# Patient Record
Sex: FEMALE | Race: WHITE | NOT HISPANIC OR LATINO | Employment: PART TIME | URBAN - METROPOLITAN AREA
[De-identification: names, ages, dates, MRNs, and addresses within clinical notes are randomized per-mention and may not be internally consistent; named-entity substitution may affect disease eponyms.]

---

## 2018-08-15 ENCOUNTER — DOCUMENTATION (OUTPATIENT)
Dept: BARIATRICS/WEIGHT MGMT | Facility: CLINIC | Age: 33
End: 2018-08-15

## 2018-08-28 ENCOUNTER — DOCUMENTATION (OUTPATIENT)
Dept: BARIATRICS/WEIGHT MGMT | Facility: CLINIC | Age: 33
End: 2018-08-28

## 2018-08-28 ENCOUNTER — OFFICE VISIT (OUTPATIENT)
Dept: PSYCHIATRY | Facility: CLINIC | Age: 33
End: 2018-08-28

## 2018-08-28 ENCOUNTER — OFFICE VISIT (OUTPATIENT)
Dept: BARIATRICS/WEIGHT MGMT | Facility: CLINIC | Age: 33
End: 2018-08-28

## 2018-08-28 VITALS
TEMPERATURE: 98.1 F | OXYGEN SATURATION: 99 % | HEIGHT: 65 IN | RESPIRATION RATE: 18 BRPM | SYSTOLIC BLOOD PRESSURE: 134 MMHG | WEIGHT: 259.01 LBS | DIASTOLIC BLOOD PRESSURE: 82 MMHG | HEART RATE: 88 BPM | BODY MASS INDEX: 43.15 KG/M2

## 2018-08-28 DIAGNOSIS — E66.01 MORBID OBESITY WITH BMI OF 40.0-44.9, ADULT (HCC): ICD-10-CM

## 2018-08-28 DIAGNOSIS — J45.909 ASTHMA, UNSPECIFIED ASTHMA SEVERITY, UNSPECIFIED WHETHER COMPLICATED, UNSPECIFIED WHETHER PERSISTENT: ICD-10-CM

## 2018-08-28 DIAGNOSIS — G35 MS (MULTIPLE SCLEROSIS) (HCC): ICD-10-CM

## 2018-08-28 DIAGNOSIS — R60.0 LOWER EXTREMITY EDEMA: ICD-10-CM

## 2018-08-28 DIAGNOSIS — Q65.89 HIP DYSPLASIA: ICD-10-CM

## 2018-08-28 DIAGNOSIS — F17.200 CURRENT SMOKER: ICD-10-CM

## 2018-08-28 DIAGNOSIS — G43.909 MIGRAINE WITHOUT STATUS MIGRAINOSUS, NOT INTRACTABLE, UNSPECIFIED MIGRAINE TYPE: Primary | ICD-10-CM

## 2018-08-28 DIAGNOSIS — R41.3 SHORT-TERM MEMORY LOSS: ICD-10-CM

## 2018-08-28 DIAGNOSIS — K21.9 GASTROESOPHAGEAL REFLUX DISEASE, ESOPHAGITIS PRESENCE NOT SPECIFIED: ICD-10-CM

## 2018-08-28 DIAGNOSIS — F32.A DEPRESSION, UNSPECIFIED DEPRESSION TYPE: ICD-10-CM

## 2018-08-28 DIAGNOSIS — H46.9 OPTIC NEURITIS: ICD-10-CM

## 2018-08-28 DIAGNOSIS — R06.02 SOB (SHORTNESS OF BREATH) ON EXERTION: ICD-10-CM

## 2018-08-28 DIAGNOSIS — Z90.49 S/P CHOLE: ICD-10-CM

## 2018-08-28 DIAGNOSIS — R05.9 COUGHING: ICD-10-CM

## 2018-08-28 DIAGNOSIS — F43.23 ADJUSTMENT DISORDER WITH MIXED ANXIETY AND DEPRESSED MOOD: ICD-10-CM

## 2018-08-28 DIAGNOSIS — R53.83 FATIGUE, UNSPECIFIED TYPE: ICD-10-CM

## 2018-08-28 DIAGNOSIS — M25.50 ARTHRALGIA, UNSPECIFIED JOINT: ICD-10-CM

## 2018-08-28 DIAGNOSIS — F41.9 ANXIETY: ICD-10-CM

## 2018-08-28 DIAGNOSIS — E66.01 MORBID OBESITY (HCC): Primary | ICD-10-CM

## 2018-08-28 DIAGNOSIS — K58.9 IRRITABLE BOWEL SYNDROME, UNSPECIFIED TYPE: ICD-10-CM

## 2018-08-28 LAB
ALBUMIN SERPL-MCNC: 4.44 G/DL (ref 3.2–4.8)
ALBUMIN/GLOB SERPL: 1.5 G/DL (ref 1.5–2.5)
ALP SERPL-CCNC: 100 U/L (ref 25–100)
ALT SERPL-CCNC: 14 U/L (ref 7–40)
AST SERPL-CCNC: 13 U/L (ref 0–33)
BASOPHILS # BLD AUTO: 0.04 10*3/MM3 (ref 0–0.2)
BASOPHILS NFR BLD AUTO: 0.5 % (ref 0–1)
BILIRUB SERPL-MCNC: 0.1 MG/DL (ref 0.3–1.2)
BUN SERPL-MCNC: 13 MG/DL (ref 9–23)
BUN/CREAT SERPL: 13 (ref 7–25)
CALCIUM SERPL-MCNC: 8.9 MG/DL (ref 8.7–10.4)
CHLORIDE SERPL-SCNC: 115 MMOL/L (ref 99–109)
CHOLEST SERPL-MCNC: 161 MG/DL (ref 0–200)
CO2 SERPL-SCNC: 19 MMOL/L (ref 20–31)
CREAT SERPL-MCNC: 1 MG/DL (ref 0.6–1.3)
EOSINOPHIL # BLD AUTO: 0.19 10*3/MM3 (ref 0–0.3)
EOSINOPHIL NFR BLD AUTO: 2.4 % (ref 0–3)
ERYTHROCYTE [DISTWIDTH] IN BLOOD BY AUTOMATED COUNT: 14.1 % (ref 11.3–14.5)
GLOBULIN SER CALC-MCNC: 3 GM/DL
GLUCOSE SERPL-MCNC: 88 MG/DL (ref 70–100)
HBA1C MFR BLD: 5.5 % (ref 4.8–5.6)
HCT VFR BLD AUTO: 42.3 % (ref 34.5–44)
HDLC SERPL-MCNC: 48 MG/DL (ref 40–60)
HGB BLD-MCNC: 12.8 G/DL (ref 11.5–15.5)
IMM GRANULOCYTES # BLD: 0.01 10*3/MM3 (ref 0–0.03)
IMM GRANULOCYTES NFR BLD: 0.1 % (ref 0–0.6)
LDLC SERPL CALC-MCNC: 87 MG/DL (ref 0–100)
LYMPHOCYTES # BLD AUTO: 1.97 10*3/MM3 (ref 0.6–4.8)
LYMPHOCYTES NFR BLD AUTO: 25.4 % (ref 24–44)
MCH RBC QN AUTO: 26.9 PG (ref 27–31)
MCHC RBC AUTO-ENTMCNC: 30.3 G/DL (ref 32–36)
MCV RBC AUTO: 88.9 FL (ref 80–99)
MONOCYTES # BLD AUTO: 0.48 10*3/MM3 (ref 0–1)
MONOCYTES NFR BLD AUTO: 6.2 % (ref 0–12)
NEUTROPHILS # BLD AUTO: 5.08 10*3/MM3 (ref 1.5–8.3)
NEUTROPHILS NFR BLD AUTO: 65.4 % (ref 41–71)
PLATELET # BLD AUTO: 321 10*3/MM3 (ref 150–450)
POTASSIUM SERPL-SCNC: 4.5 MMOL/L (ref 3.5–5.5)
PROT SERPL-MCNC: 7.4 G/DL (ref 5.7–8.2)
RBC # BLD AUTO: 4.76 10*6/MM3 (ref 3.89–5.14)
SODIUM SERPL-SCNC: 138 MMOL/L (ref 132–146)
TRIGL SERPL-MCNC: 128 MG/DL (ref 0–150)
TSH SERPL DL<=0.005 MIU/L-ACNC: 1.13 MIU/ML (ref 0.35–5.35)
VLDLC SERPL CALC-MCNC: 25.6 MG/DL
WBC # BLD AUTO: 7.77 10*3/MM3 (ref 3.5–10.8)

## 2018-08-28 PROCEDURE — 99214 OFFICE O/P EST MOD 30 MIN: CPT | Performed by: PHYSICIAN ASSISTANT

## 2018-08-28 PROCEDURE — 90791 PSYCH DIAGNOSTIC EVALUATION: CPT | Performed by: PSYCHOLOGIST

## 2018-08-28 RX ORDER — BUPROPION HYDROCHLORIDE 150 MG/1
TABLET, EXTENDED RELEASE ORAL
COMMUNITY
Start: 2018-08-05

## 2018-08-28 RX ORDER — FAMOTIDINE 20 MG/1
TABLET, FILM COATED ORAL
COMMUNITY
Start: 2018-08-27 | End: 2018-10-12

## 2018-08-28 RX ORDER — TIZANIDINE 4 MG/1
4 TABLET ORAL NIGHTLY PRN
COMMUNITY
End: 2018-10-18

## 2018-08-28 RX ORDER — GABAPENTIN 400 MG/1
400 CAPSULE ORAL 3 TIMES DAILY
COMMUNITY
End: 2018-10-12

## 2018-08-28 RX ORDER — FLUTICASONE PROPIONATE 50 MCG
2 SPRAY, SUSPENSION (ML) NASAL DAILY
COMMUNITY
End: 2018-10-12

## 2018-08-28 RX ORDER — DOXEPIN HYDROCHLORIDE 10 MG/1
CAPSULE ORAL
COMMUNITY
Start: 2018-08-27

## 2018-08-28 RX ORDER — PROMETHAZINE HYDROCHLORIDE 25 MG/1
25 TABLET ORAL EVERY 6 HOURS PRN
COMMUNITY

## 2018-08-28 RX ORDER — CHOLECALCIFEROL (VITAMIN D3) 50 MCG
TABLET ORAL
COMMUNITY
Start: 2018-08-17

## 2018-08-28 RX ORDER — ACETAZOLAMIDE 250 MG/1
250 TABLET ORAL 3 TIMES DAILY
COMMUNITY

## 2018-08-28 RX ORDER — NARATRIPTAN 2.5 MG/1
2.5 TABLET ORAL ONCE AS NEEDED
COMMUNITY

## 2018-08-28 RX ORDER — SERTRALINE HYDROCHLORIDE 100 MG/1
100 TABLET, FILM COATED ORAL DAILY
COMMUNITY

## 2018-08-28 RX ORDER — CELECOXIB 200 MG/1
CAPSULE ORAL
COMMUNITY
Start: 2018-08-27

## 2018-08-28 RX ORDER — SENNOSIDES 8.6 MG
TABLET ORAL NIGHTLY
COMMUNITY

## 2018-08-28 RX ORDER — SIMVASTATIN 20 MG
20 TABLET ORAL NIGHTLY
COMMUNITY

## 2018-08-28 RX ORDER — GABAPENTIN 100 MG/1
100 CAPSULE ORAL 3 TIMES DAILY
COMMUNITY
End: 2018-10-12

## 2018-08-28 NOTE — PROGRESS NOTES
[facility] BARIATRIC SURGERY  [address]  Formerly [facility] [address]  [phone]      Patient  Name:  Darleen Barber  :  1985      Date of Visit: 2018      Chief Complaint:  weight gain; unable to maintain weight loss    History of Present Illness:  Darleen Barber is a 33 y.o. female who presents today for evaluation, education and consultation regarding weight loss surgery. The patient is interested in sleeve gastrectomy.     Darleen has been overweight for at least 2 years, has been 35 pounds or more overweight for at least 2 years, has been 100 pounds or more overweight for 1 or more years and started dieting at age 30.      Previous diet attempts include: Calorie Counting and Rivka's Diet; None; Wellbutrin.  The most weight Darleen lost was 25 pounds on dieting but was only able to maintain that weight loss for 1 year.  Her maximum lifetime weight is 275 pounds.    As above, patient has been overweight for many years, with numerous failed dietary/weight loss attempts.  She now has obesity related comorbidities and as such has decided to pursue weight loss surgery.    Mother had gastric bypass With Dr. Ho and is with her today.     MS:  Diagnosed 2016 with loss of vision.  3-4 flares a year requiring ED or hospitalization, treated with steroids. Followed by Dr. Corona neurologist at .  Ocrevus infusions every 6 months.  Limited mobility with imbalance. Some left sided weakness.     Arthralgias/ congenital hip dysplasia: hips, legs, shoulder, lower back.  Celebrex, muscle relaxers, gabapentin. No steroid injections.     GI history: h/o GERD: takes famotidine BID.   EGD 4 years ago.  Unknown findings. No h/o h pylori. S/p jack with acalculous cholecystitis.     All past medical, surgical, social and family history have been obtained and discussed as pertinent to bariatric surgery as below.     Past Medical History:   Diagnosis Date   • Anxiety    • Asthma    • Coughing     worse  "at night   • Current smoker    • Depression     \"adjustment disorder\"   • Fatigue    • GERD (gastroesophageal reflux disease)     takes famotidine BID.   EGD 4 years ago.  Unknown findings. No h/o h pylori.    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  Worsened by falls. Uses cane. No surgeries.    • IBS (irritable bowel syndrome)     mixed diarrhea/ constipation   • Joint pain     hips, legs, shoulder, lower back.  Celebrex, muscle relaxers, gabapentin. No steroid injections.    • Lower extremity edema    • Migraines    • Morbid obesity with BMI of 40.0-44.9, adult (CMS/[facility])    • MS (multiple sclerosis) (CMS/[facility])     Limits mobility with imbalance.  Diagnosed 2016 with loss of vision.  3-4 flares a year requiring ED or hospitalization, treated with steroids.     • Optic neuritis    • S/P jack     acalculous    • Seasonal allergies    • Short-term memory loss     secondary to MS   • SOB (shortness of breath) on exertion      Past Surgical History:   Procedure Laterality Date   • COLONOSCOPY  2014    unremarkable   • DILATATION AND CURETTAGE     • EAR TUBES  1988   • LAPAROSCOPIC CHOLECYSTECTOMY  2013    acalculous   • LUMBAR PUNCTURE  2016, 2018    diagnostic testing for MS, removing CSF   • TONSILLECTOMY  1989       Allergies   Allergen Reactions   • Sulfa Antibiotics Other (See Comments)     Very hyperactive       Current Outpatient Prescriptions:   •  acetaZOLAMIDE (DIAMOX) 250 MG tablet, Take 250 mg by mouth 3 (Three) Times a Day., Disp: , Rfl:   •  buPROPion SR (WELLBUTRIN SR) 150 MG 12 hr tablet, , Disp: , Rfl:   •  celecoxib (CeleBREX) 200 MG capsule, , Disp: , Rfl:   •  Cholecalciferol (VITAMIN D) 2000 units tablet, , Disp: , Rfl:   •  doxepin (SINEquan) 10 MG capsule, , Disp: , Rfl:   •  fluticasone (FLONASE) 50 MCG/ACT nasal spray, 2 sprays into the nostril(s) as directed by provider Daily., Disp: , Rfl:   •  gabapentin (NEURONTIN) 100 MG capsule, Take 100 mg by mouth 3 (Three) Times a Day., Disp: , Rfl:   •  " gabapentin (NEURONTIN) 400 MG capsule, Take 400 mg by mouth 3 (Three) Times a Day., Disp: , Rfl:   •  naratriptan (AMERGE) 2.5 MG tablet, Take 2.5 mg by mouth 1 (One) Time As Needed for Migraine. 2.5 mg at onset of headache, may repeat in 4 hours if needed, Disp: , Rfl:   •  promethazine (PHENERGAN) 25 MG tablet, Take 25 mg by mouth Every 6 (Six) Hours As Needed for Nausea or Vomiting., Disp: , Rfl:   •  senna (SENOKOT) 8.6 MG tablet tablet, Take  by mouth Every Night., Disp: , Rfl:   •  sertraline (ZOLOFT) 100 MG tablet, Take 100 mg by mouth Daily., Disp: , Rfl:   •  simvastatin (ZOCOR) 20 MG tablet, Take 20 mg by mouth Every Night., Disp: , Rfl:   •  tiZANidine (ZANAFLEX) 4 MG tablet, Take 4 mg by mouth At Night As Needed for Muscle Spasms., Disp: , Rfl:   •  famotidine (PEPCID) 20 MG tablet, , Disp: , Rfl:     Social History     Social History   • Marital status: Single     Spouse name: N/A   • Number of children: N/A   • Years of education: N/A     Occupational History   • Not on file.     Social History Main Topics   • Smoking status: Current Every Day Smoker     Packs/day: 0.25     Types: Cigarettes   • Smokeless tobacco: Never Used      Comment: light, daily smoker   • Alcohol use No   • Drug use: Yes     Types: Marijuana      Comment: Daily   • Sexual activity: Not on file      Comment: no hormones     Other Topics Concern   • Not on file     Social History Narrative    Lives in [place] with mother and roommate (ex- girlfriend). 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       Family History   Problem Relation Age of Onset   • Obesity Mother    • Sleep apnea Mother    • Heart attack Maternal Grandmother    • Obesity Maternal Grandmother    • Hypertension Maternal Grandmother    • Heart disease Maternal Grandmother    • Sleep apnea Maternal Grandmother    • Obesity Maternal Grandfather    • Diabetes Maternal Grandfather    • Hypertension Maternal Grandfather    • Heart attack Maternal Grandfather    • Heart  disease Maternal Grandfather    • Sleep apnea Maternal Grandfather    • Pneumonia Maternal Grandfather        Review of Systems:  Constitutional:  Reports fatigue, weight gain and denies fevers, chills.  HEENT:  seasonal allergies, migraines, optic neuritis  Cardiovascular:  Reports HLD, edema and denies HTN, CAD, Atrial Fib, hx heart disease, heart murmur, hx MI, chest pain, palpitations, hx DVT.  Respiratory:  Reports shortness of breath , cough , asthma and denies wheezing, sleep apnea, hx PE.  Gastrointestinal:  Reports heartburn, IBS, gallbladder issues and denies dysphagia, nausea, vomiting, abdominal pain, melena, blood in stool, liver disease, hx pancreatitis.  Genitourinary:  Reports none and denies history of  frequent UTI, incontinence, hematuria, dysuria, polyuria, polydipsia, renal insufficiency, renal failure.    Musculoskeletal:  Reports joint pain, back pain and denies fibromyalgia.  Neurological:  Reports migraines, numbness /tingling, dizziness, confusion, MS and denies seizure, stroke.  Psychiatric:  Reports anxiety, depression and denies bipolar disorder, hx suicide attempt, hx self injury, eating disorder.  Endocrine:  Reports none and denies glucose intolerance, diabetes, thyroid disease, gout.  Hematologic:  Reports none and denies anemia, bleeding disorder, hx blood transfusion.  Skin:  Reports none and denies rashes, hx MRSA.      Physical Exam:  Vital Signs:  Weight: 117 kg (259 lb 0.2 oz)   Body mass index is 43.77 kg/m².  Temp: 98.1 °F (36.7 °C)   Heart Rate: 88   BP: 134/82     Physical Exam   Constitutional: She is oriented to person, place, and time. She appears well-developed and well-nourished.   HENT:   Head: Normocephalic and atraumatic.   Mouth/Throat: Oropharynx is clear and moist.   Eyes: EOM are normal.   Neck: Normal range of motion. Neck supple. No thyromegaly present.   Cardiovascular: Normal rate, regular rhythm and normal heart sounds.    Pulmonary/Chest: Effort normal and  breath sounds normal. No respiratory distress. She has no wheezes.   Abdominal: Soft. Bowel sounds are normal. She exhibits no distension. There is no tenderness.   Lap scars   Musculoskeletal: Normal range of motion. She exhibits no edema.   Walking with cane   Neurological: She is alert and oriented to person, place, and time.   Skin: Skin is warm and dry.   tattoos   Psychiatric: She has a normal mood and affect. Her behavior is normal. Judgment and thought content normal.   Vitals reviewed.      Patient Active Problem List   Diagnosis   • Optic neuritis   • MS (multiple sclerosis) (CMS/[facility])   • Hip dysplasia   • Fatigue   • Seasonal allergies   • Asthma   • Migraines   • Anxiety   • Morbid obesity with BMI of 40.0-44.9, adult (CMS/[facility])   • GERD (gastroesophageal reflux disease)   • Lower extremity edema   • S/P jack   • SOB (shortness of breath) on exertion   • IBS (irritable bowel syndrome)   • Coughing   • Depression   • Joint pain   • Current smoker   • Short-term memory loss       Assessment:    Darleen Barber is a 33 y.o. year old female with medically complicated obesity pursuing sleeve gastrectomy.    Weight loss surgery is deemed medically necessary given the following obesity related comorbidities including osteoarthritis, back pain, knee pain, GERD, gall bladder disease, asthma, edema, depression and MS. Hip dysplasia with current Weight: 117 kg (259 lb 0.2 oz) and Body mass index is 43.77 kg/m²..    Plan:  The consultation plan and program requirements were reviewed with the patient.  The patient has been advised that a letter of medical support must be obtained from her primary care physician or referring provider. A psychological evaluation will be arranged.  A nutritional evaluation will be performed.  The patient was advised to start a high protein and low carbohydrate diet.  Necessary lifestyle modifications were discussed.  Instructions on how to access SHABBIR was given to the patient.  SHABBIR is an  internet based educational video that explains the surgical procedure chosen and answers basic questions regarding that procedure.     Preoperative testing will include: CBC, CMP, Lipids, TSH, HgA1C, H.Pylori, Pulmonary Function Testing, Nicotine Testing, CXR, EKG and EGD     Additional preop clearances required prior to surgery: Cardiac and Neurology.  Will schedule with [facility] cardiology.  Patient will schedule with established neurologist to discuss need to avoid steroids and immunomodulators perioperatively.     The patient has been educated on expected postoperative lifestyle changes, including commitment to high protein diet, vitamin regimen, and exercise program.  They are aware that support groups are encouraged for optimal weight loss results. Patient understands that bariatric surgery is not cosmetic surgery but rather a tool to help make a lifelong commitment to lifestyle changes including diet, exercise, behavior modifications, and healthy habits. The procedure was discussed with the patient and all questions were answered. The importance of avoiding ASA/ NSAIDS/ steroids/ tobacco/ hormones/ immunomodulators perioperatively was discussed.         Kathy Hugo PA-C

## 2018-08-28 NOTE — PROGRESS NOTES
PROGRESS NOTE    Data:  Darleen Barber is a 33 y.o. female who met with the undersigned for a scheduled individual outpatient therapy session from 8:00 - 8:40am.      Clinical Maneuvering/Intervention:      Pt talked about struggling with obesity for the last couple of years. Despite trying different weight loss plans and diets, the pt reported being unsuccessful in losing weight. A psychological evaluation was conducted in order to assess past and current level of functioning. Areas assessed included, but were not limited to: perception of social support, perception of ability to face and deal with challenges in life (positive functioning), anxiety symptoms, depressive symptoms, perspective on beliefs/belief system, coping skills for stress, intelligence level, etc. Therapeutic rapport was established. Interventions conducted today were geared towards assessing the pt's readiness for weight loss surgery and identifying and psychological contraindications for undergoing such a major life change. Social support was deemed strong (specific to weight loss surgery/weight loss in this manner and in a general sense): mother and friends. Current psychological struggles were deemed moderate, but included: MS, MS symptoms, weight gain (which she attributes to having to take steroids for MS symptoms), and anxiety/depression due to having MS. She does see a therapist for anxiety/depression symptoms and takes medication to treat these symptoms. Stress level was deemed moderate and related to living with her girlfriend (they are currently starting to breakup) and her girlfriend's brother. She does admit to feeling defeated by weight gain and that she may withhold at times how much this bothers her. Coping skills for distress and related to undergoing a major life change such as weight loss surgery/weight loss were deemed strong and included reaching out for help when she needs to, having an optimistic view of life, and having the  ability to use humor in difficult situations. The pt endorsed having characteristics of readiness to improve quality of life through the major life changes inherent in the journey of weight loss surgery including explaining how she has the mindset of being ready for this and not feeling very nervous about it. The pt could also articulate a deep sense of purpose in undergoing this major life change by saying that she want to do it for health reasons: to help with MS symptoms and be able to get hip surgery. Demonstration of positive/appropriate dietary changes and evidence of exercising were limited, but she could talk about what she plans to do in the near future.     Mental Status Exam  Hygiene:  good  Dress: normal  Attitude:  cooperative and proactive  Motor Activity: normal  Speech: normal  Mood:  level   Affect:  congruent  Thought Processes: normal  Thought Content:  normal  Suicidal Thoughts:  not endorsed  Homicidal Thoughts:  not endorsed  Crisis Safety Plan: not needed   Hallucinations:  none      Patient's Support Network Includes:  family, friends      Progress toward goal: there is evidence to suggest that she is taking measures to improve the quality of her life including seeking weight loss surgery.      Functional Status: moderate      Prognosis: good    Assessment      The pt presented with adjustment disorder - anxiety/depression mixed mainly related to having MS, but depressive symptoms at times being exasperated by obesity.The pt is in the planning stage of change (having a readiness mindset and planning to change eating habits in the near future), moving towards the action stage of change (demonstrating compliance with diet changes required to fulfill candidacy for surgery) regarding readiness to receive weight loss surgery/lose weight.     From a psychological standpoint, the pt presents as a good candidate for bariatric surgery.  She is motivated for the surgery, has showed readiness for the  lifestyle change in terms of planning to soon adjust her eating habits, and seems to have appropriate expectations of how to prepare and how to live after surgery in order to lose weight successfully.      Plan      In order to diminish symptoms distress surrounding obesity, the pt is to follow up with her bariatric surgeon in order to receive weight loss surgery as soon as feasible/appropriate and based on success with compliance to adhering to the proper diet. In order to manage symptoms of anxiety/depression, she is to continue seeing her therapist and taking medication to manage mood (ongoing).     Gina Christina, PhD, LP

## 2018-09-06 NOTE — PROGRESS NOTES
"Weight Loss Surgery  Presurgical Nutrition Assessment     Darleen Barber  08/28/2018  [id number]  [id number]  1985  female    Surgery desired: Sleeve Gastrectomy    Weight: 117 kg (259 lb 0.2 oz)   Body mass index is 43.77 kg/m².  Past Medical History:   Diagnosis Date   • Anxiety    • Asthma    • Coughing     worse at night   • Current smoker    • Depression     \"adjustment disorder\"   • Fatigue    • GERD (gastroesophageal reflux disease)     takes famotidine BID.   EGD 4 years ago.  Unknown findings. No h/o h pylori.    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  Worsened by falls. Uses cane. No surgeries.    • IBS (irritable bowel syndrome)     mixed diarrhea/ constipation   • Joint pain     hips, legs, shoulder, lower back.  Celebrex, muscle relaxers, gabapentin. No steroid injections.    • Lower extremity edema    • Migraines    • Morbid obesity with BMI of 40.0-44.9, adult (CMS/[facility])    • MS (multiple sclerosis) (CMS/[facility])     Limits mobility with imbalance.  Diagnosed 2016 with loss of vision.  3-4 flares a year requiring ED or hospitalization, treated with steroids.     • Optic neuritis    • S/P jack     acalculous    • Seasonal allergies    • Short-term memory loss     secondary to MS   • SOB (shortness of breath) on exertion      Past Surgical History:   Procedure Laterality Date   • COLONOSCOPY  2014    unremarkable   • DILATATION AND CURETTAGE     • EAR TUBES  1988   • LAPAROSCOPIC CHOLECYSTECTOMY  2013    acalculous   • LUMBAR PUNCTURE  2016, 2018    diagnostic testing for MS, removing CSF   • TONSILLECTOMY  1989     Allergies   Allergen Reactions   • Sulfa Antibiotics Other (See Comments)     Very hyperactive       Current Outpatient Prescriptions:   •  acetaZOLAMIDE (DIAMOX) 250 MG tablet, Take 250 mg by mouth 3 (Three) Times a Day., Disp: , Rfl:   •  buPROPion SR (WELLBUTRIN SR) 150 MG 12 hr tablet, , Disp: , Rfl:   •  celecoxib (CeleBREX) 200 MG capsule, , Disp: , Rfl:   •  Cholecalciferol (VITAMIN D) 2000 " units tablet, , Disp: , Rfl:   •  doxepin (SINEquan) 10 MG capsule, , Disp: , Rfl:   •  famotidine (PEPCID) 20 MG tablet, , Disp: , Rfl:   •  fluticasone (FLONASE) 50 MCG/ACT nasal spray, 2 sprays into the nostril(s) as directed by provider Daily., Disp: , Rfl:   •  gabapentin (NEURONTIN) 100 MG capsule, Take 100 mg by mouth 3 (Three) Times a Day., Disp: , Rfl:   •  gabapentin (NEURONTIN) 400 MG capsule, Take 400 mg by mouth 3 (Three) Times a Day., Disp: , Rfl:   •  naratriptan (AMERGE) 2.5 MG tablet, Take 2.5 mg by mouth 1 (One) Time As Needed for Migraine. 2.5 mg at onset of headache, may repeat in 4 hours if needed, Disp: , Rfl:   •  Ocrelizumab 300 MG/10ML solution, Infuse  into a venous catheter., Disp: , Rfl:   •  promethazine (PHENERGAN) 25 MG tablet, Take 25 mg by mouth Every 6 (Six) Hours As Needed for Nausea or Vomiting., Disp: , Rfl:   •  senna (SENOKOT) 8.6 MG tablet tablet, Take  by mouth Every Night., Disp: , Rfl:   •  sertraline (ZOLOFT) 100 MG tablet, Take 100 mg by mouth Daily., Disp: , Rfl:   •  simvastatin (ZOCOR) 20 MG tablet, Take 20 mg by mouth Every Night., Disp: , Rfl:   •  tiZANidine (ZANAFLEX) 4 MG tablet, Take 4 mg by mouth At Night As Needed for Muscle Spasms., Disp: , Rfl:       Nutrition Assessment    Estimated energy needs: 2000    Estimated calories for weight loss:1500    IBW (Pounds):  159      Excess body weight (Pounds):100       Nutrition Recall  24 Hour recall: (B) (L) (D) -  Reviewed and discussed with patient       Exercise  never      Education    Provided manual:    Sleeve Gastrectomy    Provided follow-up options for support, including contact information for dietitians here, if desired.  Web-based support information and apps for smart phones and computers given.  Noted that monthly support group is offered at this clinic, and that support is associated with weight loss.    Recommend that team proceed with surgery and follow per protocol.      Nutrition Goals   Dietary  Guidelines per manual  Protein goal:  grams per day   Eliminate soda    Exercise Goals  Add 15-30 minutes of activity per day as tolerated        Eva Monae RD  08/28/2018  9:05 AM

## 2018-09-28 ENCOUNTER — RESULTS ENCOUNTER (OUTPATIENT)
Dept: BARIATRICS/WEIGHT MGMT | Facility: CLINIC | Age: 33
End: 2018-09-28

## 2018-09-28 DIAGNOSIS — R05.9 COUGHING: ICD-10-CM

## 2018-09-28 DIAGNOSIS — K21.9 GASTROESOPHAGEAL REFLUX DISEASE, ESOPHAGITIS PRESENCE NOT SPECIFIED: ICD-10-CM

## 2018-10-12 ENCOUNTER — OFFICE VISIT (OUTPATIENT)
Dept: BARIATRICS/WEIGHT MGMT | Facility: CLINIC | Age: 33
End: 2018-10-12

## 2018-10-12 VITALS
HEIGHT: 65 IN | RESPIRATION RATE: 18 BRPM | TEMPERATURE: 97.8 F | SYSTOLIC BLOOD PRESSURE: 122 MMHG | BODY MASS INDEX: 44.48 KG/M2 | OXYGEN SATURATION: 99 % | HEART RATE: 95 BPM | DIASTOLIC BLOOD PRESSURE: 84 MMHG | WEIGHT: 267 LBS

## 2018-10-12 DIAGNOSIS — K21.9 GASTROESOPHAGEAL REFLUX DISEASE, ESOPHAGITIS PRESENCE NOT SPECIFIED: Primary | ICD-10-CM

## 2018-10-12 DIAGNOSIS — G35 MS (MULTIPLE SCLEROSIS) (HCC): ICD-10-CM

## 2018-10-12 DIAGNOSIS — E66.01 MORBID OBESITY (HCC): ICD-10-CM

## 2018-10-12 PROCEDURE — 99214 OFFICE O/P EST MOD 30 MIN: CPT | Performed by: PHYSICIAN ASSISTANT

## 2018-10-12 RX ORDER — FLUTICASONE PROPIONATE 50 MCG
2 SPRAY, SUSPENSION (ML) NASAL DAILY
COMMUNITY

## 2018-10-12 RX ORDER — METHOCARBAMOL 500 MG/1
500 TABLET, FILM COATED ORAL 3 TIMES DAILY
COMMUNITY

## 2018-10-12 RX ORDER — FAMOTIDINE 20 MG/1
20 TABLET, FILM COATED ORAL 2 TIMES DAILY
COMMUNITY
End: 2019-02-28

## 2018-10-12 RX ORDER — GABAPENTIN 600 MG/1
600 TABLET ORAL 3 TIMES DAILY
COMMUNITY

## 2018-10-12 NOTE — PROGRESS NOTES
"[facility] Bariatric Surgery  [address]  [facility] [address] [phone]        Patient Name: Darleen Barber.  YOB: 1985      Date of Visit: 10/12/2018      Reason for Visit:  weight gain; unable to maintain weight loss    HPI:  Darleen Barber is a 33 y.o. female pursuing sleeve gastrectomy w/ Dr. Vargas.    Initial Intake Eval w/ GENNA.Bethel PAC 8/28/18: \"Patient has been overweight for many years, with numerous failed dietary/weight loss attempts.  She now has obesity related comorbidities and as such has decided to pursue weight loss surgery.     Mother had gastric bypass with Dr. Ho and is with her today.      MS:  Diagnosed 2016 with loss of vision.  3-4 flares a year requiring ED or hospitalization, treated with steroids. Followed by Dr. Corona neurologist at .  Ocrevus infusions every 6 months.  Limited mobility with imbalance. Some left sided weakness.      Arthralgias/ congenital hip dysplasia: hips, legs, shoulder, lower back.  Celebrex, muscle relaxers, gabapentin. No steroid injections.      GI history: h/o GERD: takes famotidine BID.   EGD 4 years ago.  Unknown findings. No h/o h pylori. s/p jack with acalculous cholecystitis.\"    Returns today to update h&p prior to EGD.  Continues on Pepcid BID but says reflux uncontrolled.  Has had better success in the past w/ PPI RX.  Denies dysphagia/N/V/AP.  H.Pylori stool test not yet done.    Otherwise hx unchanged.  Continues on Ocrelizamab infusions q6 months for MS.  Will be following w/ Dr. Corona @  (records being transferred from previous Neurologist who has since left the practice), but next appt not scheduled until Dec.  Last MS flair in 2017.  Last infusion 9/18/18.   Gets steroids w/ each infusion and will typically gain ~10 lbs following each infusion.  Weight up 8 lbs since last visit.        Past Medical History:   Diagnosis Date   • Anxiety    • Asthma    • Coughing     worse at night   • Current " "smoker    • Depression     \"adjustment disorder\"   • Fatigue    • GERD (gastroesophageal reflux disease)     takes famotidine BID.   EGD 4 years ago.  Unknown findings. No h/o h pylori.    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  Worsened by falls. Uses cane. No surgeries.    • IBS (irritable bowel syndrome)     mixed diarrhea/ constipation   • Joint pain     hips, legs, shoulder, lower back.  Celebrex, muscle relaxers, gabapentin. No steroid injections.    • Lower extremity edema    • Migraines    • Morbid obesity with BMI of 40.0-44.9, adult (CMS/[facility])    • MS (multiple sclerosis) (CMS/[facility])     Limits mobility with imbalance.  Diagnosed 2016 with loss of vision.  3-4 flares a year requiring ED or hospitalization, treated with steroids.     • Optic neuritis    • Pseudotumor cerebri     followed by Neurology, on Diamox   • S/P jack     acalculous    • Seasonal allergies    • Short-term memory loss     secondary to MS   • SOB (shortness of breath) on exertion      Past Surgical History:   Procedure Laterality Date   • COLONOSCOPY  2014    unremarkable   • DILATATION AND CURETTAGE     • EAR TUBES  1988   • LAPAROSCOPIC CHOLECYSTECTOMY  2013    acalculous   • LUMBAR PUNCTURE  2016, 2018    diagnostic testing for MS, removing CSF   • TONSILLECTOMY  1989     Outpatient Prescriptions Marked as Taking for the 10/12/18 encounter (Office Visit) with Yuliana Allan PA   Medication Sig Dispense Refill   • acetaZOLAMIDE (DIAMOX) 250 MG tablet Take 250 mg by mouth 3 (Three) Times a Day.     • buPROPion SR (WELLBUTRIN SR) 150 MG 12 hr tablet      • celecoxib (CeleBREX) 200 MG capsule      • Cholecalciferol (VITAMIN D) 2000 units tablet      • doxepin (SINEquan) 10 MG capsule      • famotidine (PEPCID) 20 MG tablet Take 20 mg by mouth 2 (Two) Times a Day.     • fluticasone (FLONASE) 50 MCG/ACT nasal spray 2 sprays into the nostril(s) as directed by provider Daily.     • gabapentin (NEURONTIN) 600 MG tablet Take 600 mg by mouth 3 " (Three) Times a Day.     • methocarbamol (ROBAXIN) 500 MG tablet Take 500 mg by mouth 4 (Four) Times a Day.     • naratriptan (AMERGE) 2.5 MG tablet Take 2.5 mg by mouth 1 (One) Time As Needed for Migraine. 2.5 mg at onset of headache, may repeat in 4 hours if needed     • Ocrelizumab 300 MG/10ML solution Infuse  into a venous catheter Every 6 (Six) Months.     • promethazine (PHENERGAN) 25 MG tablet Take 25 mg by mouth Every 6 (Six) Hours As Needed for Nausea or Vomiting.     • senna (SENOKOT) 8.6 MG tablet tablet Take  by mouth Every Night.     • sertraline (ZOLOFT) 100 MG tablet Take 100 mg by mouth Daily.     • simvastatin (ZOCOR) 20 MG tablet Take 20 mg by mouth Every Night.     • tiZANidine (ZANAFLEX) 4 MG tablet Take 4 mg by mouth At Night As Needed for Muscle Spasms.     • [DISCONTINUED] famotidine (PEPCID) 20 MG tablet        Allergies   Allergen Reactions   • Sulfa Antibiotics Other (See Comments)     Very hyperactive       Social History     Social History   • Marital status: Single     Spouse name: N/A   • Number of children: N/A   • Years of education: N/A     Occupational History   • Not on file.     Social History Main Topics   • Smoking status: Current Every Day Smoker     Packs/day: 0.25     Types: Cigarettes   • Smokeless tobacco: Never Used      Comment: light, daily smoker   • Alcohol use No   • Drug use: Yes     Types: Marijuana      Comment: Daily   • Sexual activity: Not on file      Comment: no hormones     Other Topics Concern   • Not on file     Social History Narrative    Lives in [facility] [place] [facility] with mother and roommate (ex- girlfriend). 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         Vitals:    10/12/18 1042   BP: 122/84   Pulse: 95   Resp: 18   Temp: 97.8 °F (36.6 °C)   SpO2: 99%     Weight 121 kg (267 lb)  Body mass index is 45.12 kg/m².    Physical Exam   Constitutional: She appears well-developed and well-nourished. She is cooperative.   HENT:   Mouth/Throat: Oropharynx is clear and moist  and mucous membranes are normal.   Eyes: Conjunctivae are normal. No scleral icterus.   Cardiovascular: Normal rate.    Pulmonary/Chest: Effort normal.   Abdominal: Soft. There is no tenderness.   Musculoskeletal: She exhibits no edema.   ambulates w/ a cane   Neurological: She is alert.   Skin: Skin is warm and dry. No rash noted.   Psychiatric: She has a normal mood and affect. Judgment normal.         Assessment:  33 y.o. female pursuing sleeve gastrectomy w/ Dr. Vargas.    ICD-10-CM ICD-9-CM   1. Gastroesophageal reflux disease, esophagitis presence not specified K21.9 530.81   2. MS (multiple sclerosis) (CMS/[facility]) G35 340   3. Morbid obesity (CMS/[facility]) E66.01 278.01       Plan:  Will proceed w/ EGD to further evaluate GERD.  HPSA reordered.  The risks and benefits of the upper endoscopy were discussed with the patient in detail and all questions were answered.  Possibility of perforation, bleeding, aspiration, and anesthesia reaction were reviewed.  Patient agrees to proceed.  Further input pending results.

## 2018-10-18 ENCOUNTER — OFFICE VISIT (OUTPATIENT)
Dept: CARDIOLOGY | Facility: CLINIC | Age: 33
End: 2018-10-18

## 2018-10-18 VITALS
WEIGHT: 272 LBS | DIASTOLIC BLOOD PRESSURE: 86 MMHG | SYSTOLIC BLOOD PRESSURE: 130 MMHG | HEART RATE: 92 BPM | HEIGHT: 64 IN | BODY MASS INDEX: 46.44 KG/M2

## 2018-10-18 DIAGNOSIS — F17.200 SMOKER: ICD-10-CM

## 2018-10-18 DIAGNOSIS — I10 ESSENTIAL HYPERTENSION: ICD-10-CM

## 2018-10-18 DIAGNOSIS — R06.09 DOE (DYSPNEA ON EXERTION): Primary | ICD-10-CM

## 2018-10-18 DIAGNOSIS — E78.2 MIXED HYPERLIPIDEMIA: ICD-10-CM

## 2018-10-18 PROCEDURE — 99203 OFFICE O/P NEW LOW 30 MIN: CPT | Performed by: INTERNAL MEDICINE

## 2018-11-21 ENCOUNTER — OFFICE VISIT (OUTPATIENT)
Dept: BARIATRICS/WEIGHT MGMT | Facility: CLINIC | Age: 33
End: 2018-11-21

## 2018-11-21 VITALS
HEIGHT: 65 IN | BODY MASS INDEX: 44.57 KG/M2 | HEART RATE: 99 BPM | OXYGEN SATURATION: 99 % | TEMPERATURE: 98.1 F | SYSTOLIC BLOOD PRESSURE: 124 MMHG | DIASTOLIC BLOOD PRESSURE: 76 MMHG | WEIGHT: 267.5 LBS | RESPIRATION RATE: 18 BRPM

## 2018-11-21 DIAGNOSIS — H46.9 OPTIC NEURITIS: ICD-10-CM

## 2018-11-21 DIAGNOSIS — K58.9 IRRITABLE BOWEL SYNDROME, UNSPECIFIED TYPE: ICD-10-CM

## 2018-11-21 DIAGNOSIS — R06.02 SOB (SHORTNESS OF BREATH) ON EXERTION: ICD-10-CM

## 2018-11-21 DIAGNOSIS — Q65.89 HIP DYSPLASIA: ICD-10-CM

## 2018-11-21 DIAGNOSIS — M25.50 ARTHRALGIA, UNSPECIFIED JOINT: ICD-10-CM

## 2018-11-21 DIAGNOSIS — J45.909 ASTHMA, UNSPECIFIED ASTHMA SEVERITY, UNSPECIFIED WHETHER COMPLICATED, UNSPECIFIED WHETHER PERSISTENT: ICD-10-CM

## 2018-11-21 DIAGNOSIS — E66.01 MORBID OBESITY (HCC): ICD-10-CM

## 2018-11-21 DIAGNOSIS — R60.0 LOWER EXTREMITY EDEMA: ICD-10-CM

## 2018-11-21 DIAGNOSIS — K21.9 GASTROESOPHAGEAL REFLUX DISEASE, ESOPHAGITIS PRESENCE NOT SPECIFIED: Primary | ICD-10-CM

## 2018-11-21 DIAGNOSIS — R53.83 FATIGUE, UNSPECIFIED TYPE: ICD-10-CM

## 2018-11-21 DIAGNOSIS — G35 MS (MULTIPLE SCLEROSIS) (HCC): ICD-10-CM

## 2018-11-21 PROCEDURE — 99215 OFFICE O/P EST HI 40 MIN: CPT | Performed by: SURGERY

## 2018-11-21 NOTE — PROGRESS NOTES
[facility] BARIATRIC SURGERY  [address]  [facility] [address] [phone]      Patient  Name:  Darleen Barber  :  1985      Date of Visit: 2018      Chief Complaint:  GERD, medical weight loss visit.    History of Present Illness:  Darleen Barber is a 33 y.o. female who presents today for evaluation, education and consultation regarding weight loss surgery. The patient is interested in sleeve gastrectomy.    Original intake with  18.  Pursuing WLS with me.  Her mother had gastric bypass with Dr. Ho.      MS, dx  with vision loss.  Has been on monoclonal ab (ocrelizumab) since 2017 which is working well, very few flares.  Takes this injection twice yearly and takes steroids with it each time.  Last injection was , next one will be in March.  Discussed likely doing surgery date in April, 1 month after next MAB injection b/c takes steroids with it.    3-4 flares per year.  Followed by Dr. Corona at .  Limited mobility with imbalance.  Some left sided weakness.      Awaiting H. Pylori testing.       Arthalgias/congenital hip dysplasia: hips, legs, shoulder, lower back.  Celebrex, muscle relaxers, gabapentin, no steroids for this.       GERD: takes famotidine bid, but not well controlled.  Some breakthrough reflux.  Last EGD 4 years ago, unknown findings.  No hx H. Pylori . Hx lap jack for acalculous cholecystitis.  Previously took omerprazole.  Would like to be back on it.      LOV weight 272, has lost 5 pounds and is 267 today.    Has tried to eat less fried foods, more baked.  Has decreased junk food, and avoids pop.  Drinks G2 now because less sugar.  Tries to snack on yogurt.      Smokes 1 cigarette every few days.        All past medical, surgical, social and family history have been obtained and discussed as pertinent to bariatric surgery as below.     Past Medical History:   Diagnosis Date   • Anxiety    • Asthma    • Coughing     worse at night   •  "Current smoker    • Depression     \"adjustment disorder\"   • Fatigue    • GERD (gastroesophageal reflux disease)     takes famotidine BID.   EGD 4 years ago.  Unknown findings. No h/o h pylori.    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  Worsened by falls. Uses cane. No surgeries.    • IBS (irritable bowel syndrome)     mixed diarrhea/ constipation   • Joint pain     hips, legs, shoulder, lower back.  Celebrex, muscle relaxers, gabapentin. No steroid injections.    • Lower extremity edema    • Migraines    • Morbid obesity with BMI of 40.0-44.9, adult (CMS/[facility])    • MS (multiple sclerosis) (CMS/[facility])     Limits mobility with imbalance.  Diagnosed 2016 with loss of vision.  3-4 flares a year requiring ED or hospitalization, treated with steroids.     • Optic neuritis    • Pseudotumor cerebri     followed by Neurology, on Diamox   • S/P jack     acalculous    • Seasonal allergies    • Short-term memory loss     secondary to MS   • SOB (shortness of breath) on exertion      Past Surgical History:   Procedure Laterality Date   • COLONOSCOPY  2014    unremarkable   • DILATATION AND CURETTAGE     • EAR TUBES  1988   • LAPAROSCOPIC CHOLECYSTECTOMY  2013    acalculous   • LUMBAR PUNCTURE  2016, 2018    diagnostic testing for MS, removing CSF   • TONSILLECTOMY  1989       Allergies   Allergen Reactions   • Sulfa Antibiotics Other (See Comments)     Very hyperactive       Current Outpatient Medications:   •  acetaZOLAMIDE (DIAMOX) 250 MG tablet, Take 250 mg by mouth 3 (Three) Times a Day., Disp: , Rfl:   •  buPROPion SR (WELLBUTRIN SR) 150 MG 12 hr tablet, , Disp: , Rfl:   •  celecoxib (CeleBREX) 200 MG capsule, , Disp: , Rfl:   •  Cholecalciferol (VITAMIN D) 2000 units tablet, , Disp: , Rfl:   •  doxepin (SINEquan) 10 MG capsule, , Disp: , Rfl:   •  famotidine (PEPCID) 20 MG tablet, Take 20 mg by mouth 2 (Two) Times a Day., Disp: , Rfl:   •  fluticasone (FLONASE) 50 MCG/ACT nasal spray, 2 sprays into the nostril(s) as directed by " provider Daily., Disp: , Rfl:   •  gabapentin (NEURONTIN) 600 MG tablet, Take 600 mg by mouth 3 (Three) Times a Day., Disp: , Rfl:   •  methocarbamol (ROBAXIN) 500 MG tablet, Take 500 mg by mouth 3 (Three) Times a Day., Disp: , Rfl:   •  naratriptan (AMERGE) 2.5 MG tablet, Take 2.5 mg by mouth 1 (One) Time As Needed for Migraine. 2.5 mg at onset of headache, may repeat in 4 hours if needed, Disp: , Rfl:   •  Ocrelizumab 300 MG/10ML solution, Infuse  into a venous catheter Every 6 (Six) Months., Disp: , Rfl:   •  promethazine (PHENERGAN) 25 MG tablet, Take 25 mg by mouth Every 6 (Six) Hours As Needed for Nausea or Vomiting., Disp: , Rfl:   •  senna (SENOKOT) 8.6 MG tablet tablet, Take  by mouth Every Night., Disp: , Rfl:   •  sertraline (ZOLOFT) 100 MG tablet, Take 100 mg by mouth Daily., Disp: , Rfl:   •  simvastatin (ZOCOR) 20 MG tablet, Take 20 mg by mouth Every Night., Disp: , Rfl:     Social History     Socioeconomic History   • Marital status: Single     Spouse name: Not on file   • Number of children: Not on file   • Years of education: Not on file   • Highest education level: Not on file   Social Needs   • Financial resource strain: Not on file   • Food insecurity - worry: Not on file   • Food insecurity - inability: Not on file   • Transportation needs - medical: Not on file   • Transportation needs - non-medical: Not on file   Occupational History   • Not on file   Tobacco Use   • Smoking status: Current Every Day Smoker     Packs/day: 0.25     Types: Cigarettes   • Smokeless tobacco: Never Used   • Tobacco comment: light, daily smoker   Substance and Sexual Activity   • Alcohol use: No   • Drug use: Yes     Types: Marijuana     Comment: not as much   • Sexual activity: Defer     Comment: no hormones   Other Topics Concern   • Not on file   Social History Narrative    Lives in [facility] [place] [facility] with mother and roommate (ex- girlfriend). 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       Family History   Problem  Relation Age of Onset   • Obesity Mother    • Sleep apnea Mother    • Heart attack Maternal Grandmother    • Obesity Maternal Grandmother    • Hypertension Maternal Grandmother    • Heart disease Maternal Grandmother    • Sleep apnea Maternal Grandmother    • Obesity Maternal Grandfather    • Diabetes Maternal Grandfather    • Hypertension Maternal Grandfather    • Heart attack Maternal Grandfather    • Heart disease Maternal Grandfather    • Sleep apnea Maternal Grandfather    • Pneumonia Maternal Grandfather        Review of Systems   Constitutional: Positive for fatigue and unexpected weight gain. Negative for chills, diaphoresis, fever and unexpected weight loss.   HENT: Negative for congestion and facial swelling.    Eyes: Negative for blurred vision, double vision and discharge.   Respiratory: Negative for chest tightness, shortness of breath and stridor.    Cardiovascular: Negative for chest pain, palpitations and leg swelling.   Gastrointestinal: Negative for blood in stool.   Endocrine: Negative for polydipsia.   Genitourinary: Negative for hematuria.   Musculoskeletal: Positive for arthralgias.   Skin: Negative for color change.   Allergic/Immunologic: Negative for immunocompromised state.   Neurological: Negative for confusion.   Psychiatric/Behavioral: Negative for self-injury.        Physical Exam:  Vital Signs:  Weight: 121 kg (267 lb 8 oz)   Body mass index is 45.21 kg/m².  Temp: 98.1 °F (36.7 °C)   Heart Rate: 99   BP: 124/76     Physical Exam   Constitutional: She is oriented to person, place, and time. She appears well-developed and well-nourished. No distress.   HENT:   Head: Normocephalic and atraumatic.   Mouth/Throat: No oropharyngeal exudate.   Eyes: Conjunctivae and EOM are normal. Pupils are equal, round, and reactive to light.   Pulmonary/Chest: Effort normal. No respiratory distress.   Abdominal: Soft. She exhibits no distension.   Lap scars   Neurological: She is alert and oriented to  person, place, and time. No cranial nerve deficit.   cane   Skin: Skin is warm and dry. She is not diaphoretic. No pallor.   tattoos   Psychiatric: She has a normal mood and affect. Her behavior is normal. Thought content normal.       Patient Active Problem List   Diagnosis   • Optic neuritis   • MS (multiple sclerosis) (CMS/[facility])   • Hip dysplasia   • Fatigue   • Seasonal allergies   • Asthma   • Migraines   • Anxiety   • Morbid obesity with BMI of 40.0-44.9, adult (CMS/[facility])   • GERD (gastroesophageal reflux disease)   • Lower extremity edema   • S/P jack   • SOB (shortness of breath) on exertion   • IBS (irritable bowel syndrome)   • Coughing   • Depression   • Joint pain   • Current smoker   • Short-term memory loss   • Pseudotumor cerebri       Assessment:    Darleen Barber is a 33 y.o. year old female with medically complicated obesity pursuing sleeve gastrectomy.  Hx GERD.      Plan:    Upcoming EGD with biopsy.  Pending H. Pylori.  Otherwise still working on insurance requirements.    She has done excellently with weight loss  And making healthy changes.  This month I would like her to research exercises she will be able to do given her MS and poor balance.  Try strength exercises and recumbent.        MDM high:  4+ chronic medical problems  Elective procedure with risk factor of obesity    Alanis Vargas MD

## 2018-11-29 ENCOUNTER — LAB REQUISITION (OUTPATIENT)
Dept: LAB | Facility: HOSPITAL | Age: 33
End: 2018-11-29

## 2018-11-29 ENCOUNTER — OUTSIDE FACILITY SERVICE (OUTPATIENT)
Dept: BARIATRICS/WEIGHT MGMT | Facility: CLINIC | Age: 33
End: 2018-11-29

## 2018-11-29 DIAGNOSIS — K21.9 GASTRO-ESOPHAGEAL REFLUX DISEASE WITHOUT ESOPHAGITIS: ICD-10-CM

## 2018-11-29 PROCEDURE — 88305 TISSUE EXAM BY PATHOLOGIST: CPT | Performed by: SURGERY

## 2018-11-29 PROCEDURE — 43239 EGD BIOPSY SINGLE/MULTIPLE: CPT | Performed by: SURGERY

## 2018-11-29 PROCEDURE — 88312 SPECIAL STAINS GROUP 1: CPT | Performed by: SURGERY

## 2018-11-30 ENCOUNTER — TELEPHONE (OUTPATIENT)
Dept: BARIATRICS/WEIGHT MGMT | Facility: CLINIC | Age: 33
End: 2018-11-30

## 2018-11-30 LAB
CYTO UR: NORMAL
LAB AP CASE REPORT: NORMAL
LAB AP CLINICAL INFORMATION: NORMAL
PATH REPORT.FINAL DX SPEC: NORMAL
PATH REPORT.GROSS SPEC: NORMAL

## 2018-12-03 DIAGNOSIS — K21.9 GASTROESOPHAGEAL REFLUX DISEASE, ESOPHAGITIS PRESENCE NOT SPECIFIED: ICD-10-CM

## 2018-12-26 ENCOUNTER — PATIENT MESSAGE (OUTPATIENT)
Dept: BARIATRICS/WEIGHT MGMT | Facility: CLINIC | Age: 33
End: 2018-12-26

## 2018-12-26 ENCOUNTER — OFFICE VISIT (OUTPATIENT)
Dept: BARIATRICS/WEIGHT MGMT | Facility: CLINIC | Age: 33
End: 2018-12-26

## 2018-12-26 VITALS
SYSTOLIC BLOOD PRESSURE: 122 MMHG | TEMPERATURE: 97.8 F | HEART RATE: 78 BPM | HEIGHT: 65 IN | WEIGHT: 265.5 LBS | RESPIRATION RATE: 18 BRPM | BODY MASS INDEX: 44.24 KG/M2 | OXYGEN SATURATION: 99 % | DIASTOLIC BLOOD PRESSURE: 78 MMHG

## 2018-12-26 DIAGNOSIS — E66.01 MORBID OBESITY (HCC): ICD-10-CM

## 2018-12-26 DIAGNOSIS — G35 MS (MULTIPLE SCLEROSIS) (HCC): Primary | ICD-10-CM

## 2018-12-26 PROCEDURE — 99213 OFFICE O/P EST LOW 20 MIN: CPT | Performed by: PHYSICIAN ASSISTANT

## 2018-12-26 NOTE — PROGRESS NOTES
"[facility]  [address]  [facility] [id number] [phone]    Patient Name:  Darleen Barber.  :  1985      Date of Visit:  2018      Reason for Visit:  Monthly Diet Visit # 3     HPI:  Presents for monthly supervised diet visit.  Pursing LSG w/ Dr. Vargas.    Note from Dr. Vargas:  \"MS, dx 2016 with vision loss.  Has been on monoclonal ab (ocrelizumab) since 2017 which is working well, very few flares.  Takes this injection twice yearly and takes steroids with it each time.  Last injection was , next one will be in March.  Discussed likely doing surgery date in April, 1 month after next MAB injection (b/c takes steroids with it).  3-4 flares per year.  Followed by Dr. Corona at .  Limited mobility with imbalance.  Some left sided weakness.\"    Back/spine issues + (L) leg weakness worsened most recently w/ the colder weather.  Neurology eval/clearance (P) - has sleep study and MRI scheduled.    Otherwise says doing fine.  Eating 3x/day.  Drinking mostly water, juice, and G2.  Has not yet experimented w/ protein shakes.  Not tracking intake.  Walking for exercise, as able.        Initial weight: 267 lbs.  Current weight:  265 lbs.    Past Medical History:   Diagnosis Date   • Anxiety    • Asthma    • Coughing     worse at night   • Current smoker    • Depression     \"adjustment disorder\"   • Fatigue    • GERD (gastroesophageal reflux disease)     takes famotidine BID.   EGD 4 years ago.  Unknown findings. No h/o h pylori.    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  Worsened by falls. Uses cane. No surgeries.    • IBS (irritable bowel syndrome)     mixed diarrhea/ constipation   • Joint pain     hips, legs, shoulder, lower back.  Celebrex, muscle relaxers, gabapentin. No steroid injections.    • Lower extremity edema    • Migraines    • Morbid obesity with BMI of 40.0-44.9, adult (CMS/[facility])    • MS (multiple sclerosis) (CMS/[facility])     Limits mobility with " imbalance.  Diagnosed 2016 with loss of vision.  3-4 flares a year requiring ED or hospitalization, treated with steroids.     • Optic neuritis    • Pseudotumor cerebri     followed by Neurology, on Diamox   • S/P jack     acalculous    • Seasonal allergies    • Short-term memory loss     secondary to MS   • SOB (shortness of breath) on exertion      Past Surgical History:   Procedure Laterality Date   • COLONOSCOPY  2014    unremarkable   • DILATATION AND CURETTAGE     • EAR TUBES  1988   • LAPAROSCOPIC CHOLECYSTECTOMY  2013    acalculous   • LUMBAR PUNCTURE  2016, 2018    diagnostic testing for MS, removing CSF   • TONSILLECTOMY  1989       Allergies   Allergen Reactions   • Sulfa Antibiotics Other (See Comments)     Very hyperactive         Current Outpatient Medications:   •  acetaZOLAMIDE (DIAMOX) 250 MG tablet, Take 250 mg by mouth 3 (Three) Times a Day., Disp: , Rfl:   •  buPROPion SR (WELLBUTRIN SR) 150 MG 12 hr tablet, , Disp: , Rfl:   •  celecoxib (CeleBREX) 200 MG capsule, , Disp: , Rfl:   •  Cholecalciferol (VITAMIN D) 2000 units tablet, , Disp: , Rfl:   •  doxepin (SINEquan) 10 MG capsule, , Disp: , Rfl:   •  famotidine (PEPCID) 20 MG tablet, Take 20 mg by mouth 2 (Two) Times a Day., Disp: , Rfl:   •  fluticasone (FLONASE) 50 MCG/ACT nasal spray, 2 sprays into the nostril(s) as directed by provider Daily., Disp: , Rfl:   •  gabapentin (NEURONTIN) 600 MG tablet, Take 600 mg by mouth 3 (Three) Times a Day., Disp: , Rfl:   •  methocarbamol (ROBAXIN) 500 MG tablet, Take 500 mg by mouth 3 (Three) Times a Day., Disp: , Rfl:   •  naratriptan (AMERGE) 2.5 MG tablet, Take 2.5 mg by mouth 1 (One) Time As Needed for Migraine. 2.5 mg at onset of headache, may repeat in 4 hours if needed, Disp: , Rfl:   •  Ocrelizumab 300 MG/10ML solution, Infuse  into a venous catheter Every 6 (Six) Months., Disp: , Rfl:   •  promethazine (PHENERGAN) 25 MG tablet, Take 25 mg by mouth Every 6 (Six) Hours As Needed for Nausea or  "Vomiting., Disp: , Rfl:   •  senna (SENOKOT) 8.6 MG tablet tablet, Take  by mouth Every Night., Disp: , Rfl:   •  sertraline (ZOLOFT) 100 MG tablet, Take 100 mg by mouth Daily., Disp: , Rfl:   •  simvastatin (ZOCOR) 20 MG tablet, Take 20 mg by mouth Every Night., Disp: , Rfl:       /78 (BP Location: Left arm, Patient Position: Sitting, Cuff Size: Large Adult)   Pulse 78   Temp 97.8 °F (36.6 °C) (Temporal)   Resp 18   Ht 163.8 cm (64.5\")   Wt 120 kg (265 lb 8 oz)   SpO2 99%   BMI 44.87 kg/m²     Physical Exam   Constitutional: She appears well-developed and well-nourished.   Cardiovascular: Normal rate.   Pulmonary/Chest: Effort normal.   Musculoskeletal:   ambulating w/ a cane   Neurological: She is alert.   Psychiatric: She has a normal mood and affect. Judgment and thought content normal.         Assessment:   Pursuing Weight Loss Surgery.    ICD-10-CM ICD-9-CM   1. MS (multiple sclerosis) (CMS/[facility]) G35 340   2. Morbid obesity (CMS/[facility]) E66.01 278.01       Discussion/Plan:  During diet appointments the patient is educated on high quality nutrition and habits to facilitate good health and possibly some weight loss. Necessary lifestyle changes and behavior modifications were discussed. Please note, the patient remains compliant in completing her diet requirements.     Goals:   1. Continue to be mindful of healthy food choices.   2. Start experimenting w/ protein shakes.  3. Increase daily exercise/activity as able.      Follow up in 1 month. Call w/ issues/concerns.       "

## 2019-01-02 ENCOUNTER — DOCUMENTATION (OUTPATIENT)
Dept: CARDIOLOGY | Facility: CLINIC | Age: 34
End: 2019-01-02

## 2019-01-02 NOTE — PROGRESS NOTES
Sherly Carson sent to Niurka Larios RN Kristen,   Pt was seen in Oct. However I do not see a cardiac clearance. Is she cleared for surgery?   Thank you,   Aide      This came through on my messages. Note is incomplete from October. Please address clearance.   Thanks. Niurka

## 2019-01-22 ENCOUNTER — PATIENT MESSAGE (OUTPATIENT)
Dept: BARIATRICS/WEIGHT MGMT | Facility: CLINIC | Age: 34
End: 2019-01-22

## 2019-01-30 ENCOUNTER — OFFICE VISIT (OUTPATIENT)
Dept: BARIATRICS/WEIGHT MGMT | Facility: CLINIC | Age: 34
End: 2019-01-30

## 2019-01-30 VITALS
SYSTOLIC BLOOD PRESSURE: 124 MMHG | HEIGHT: 65 IN | BODY MASS INDEX: 43.74 KG/M2 | DIASTOLIC BLOOD PRESSURE: 74 MMHG | RESPIRATION RATE: 18 BRPM | OXYGEN SATURATION: 99 % | WEIGHT: 262.5 LBS | TEMPERATURE: 97.8 F | HEART RATE: 112 BPM

## 2019-01-30 DIAGNOSIS — F32.A DEPRESSION, UNSPECIFIED DEPRESSION TYPE: ICD-10-CM

## 2019-01-30 DIAGNOSIS — R60.0 LOWER EXTREMITY EDEMA: ICD-10-CM

## 2019-01-30 DIAGNOSIS — J45.909 ASTHMA, UNSPECIFIED ASTHMA SEVERITY, UNSPECIFIED WHETHER COMPLICATED, UNSPECIFIED WHETHER PERSISTENT: ICD-10-CM

## 2019-01-30 DIAGNOSIS — M25.50 ARTHRALGIA, UNSPECIFIED JOINT: ICD-10-CM

## 2019-01-30 DIAGNOSIS — R53.83 FATIGUE, UNSPECIFIED TYPE: ICD-10-CM

## 2019-01-30 DIAGNOSIS — K21.9 GASTROESOPHAGEAL REFLUX DISEASE, ESOPHAGITIS PRESENCE NOT SPECIFIED: ICD-10-CM

## 2019-01-30 DIAGNOSIS — E66.01 MORBID OBESITY (HCC): ICD-10-CM

## 2019-01-30 DIAGNOSIS — G35 MS (MULTIPLE SCLEROSIS) (HCC): Primary | ICD-10-CM

## 2019-01-30 DIAGNOSIS — G43.909 MIGRAINE WITHOUT STATUS MIGRAINOSUS, NOT INTRACTABLE, UNSPECIFIED MIGRAINE TYPE: ICD-10-CM

## 2019-01-30 PROCEDURE — 99214 OFFICE O/P EST MOD 30 MIN: CPT | Performed by: SURGERY

## 2019-01-30 RX ORDER — PANTOPRAZOLE SODIUM 20 MG/1
20 TABLET, DELAYED RELEASE ORAL DAILY
COMMUNITY

## 2019-01-30 NOTE — PROGRESS NOTES
"[facility]ariat[facility]  [address]  [facility] [id number] [phone]    Patient Name:  Darleen Barber.  :  1985      Date of Visit:  2019      Reason for Visit:  Monthly Diet Visit #4     HPI:  Presents for monthly supervised diet visit.  Has been struggling with otitis media and serous otitis since last visit.  Hasn't been able to see her regular PCP.  Hx of chronic stable MS on monoclonal ab + steroids q6 months.  Previously discussed scheduling sleeve to be 1 month after next steroid dose and must avoid x 2 months after sleeve to prevent leak.  Hx of chronic stable GERD, joint pain, peripheral edema, pseudotumor cerebri, etc.  Focusing on drinking G2 instead of regular Gatorade and pop.  Has been decreasing fast food intake.  Has been trying Slimfast protein as meal replacement.  Reports last cigarette 1 week ago.  Still skips breakfast.  Sometimes skips lunch and doesn't eat at all until dinner.  If she eats lunch she doesn't eat dinner.  Her neurologist wants her to have sleep study, but she is concerned it won't be until  and surgery will likely be sooner.     Initial weight: 267 lbs.  Current weight:  262 lbs.      Past Medical History:   Diagnosis Date   • Anxiety    • Asthma    • Coughing     worse at night   • Current smoker    • Depression     \"adjustment disorder\"   • Fatigue    • GERD (gastroesophageal reflux disease)     takes famotidine BID.   EGD 4 years ago.  Unknown findings. No h/o h pylori.    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  Worsened by falls. Uses cane. No surgeries.    • IBS (irritable bowel syndrome)     mixed diarrhea/ constipation   • Joint pain     hips, legs, shoulder, lower back.  Celebrex, muscle relaxers, gabapentin. No steroid injections.    • Lower extremity edema    • Migraines    • Morbid obesity with BMI of 40.0-44.9, adult (CMS/[facility])    • MS (multiple sclerosis) (CMS/[facility])     Limits mobility with imbalance.  Diagnosed 2016 " with loss of vision.  3-4 flares a year requiring ED or hospitalization, treated with steroids.     • Optic neuritis    • Pseudotumor cerebri     followed by Neurology, on Diamox   • S/P jack     acalculous    • Seasonal allergies    • Short-term memory loss     secondary to MS   • SOB (shortness of breath) on exertion      Past Surgical History:   Procedure Laterality Date   • COLONOSCOPY  2014    unremarkable   • DILATATION AND CURETTAGE     • EAR TUBES  1988   • LAPAROSCOPIC CHOLECYSTECTOMY  2013    acalculous   • LUMBAR PUNCTURE  2016, 2018    diagnostic testing for MS, removing CSF   • TONSILLECTOMY  1989       Allergies   Allergen Reactions   • Sulfa Antibiotics Other (See Comments)     Very hyperactive         Current Outpatient Medications:   •  acetaZOLAMIDE (DIAMOX) 250 MG tablet, Take 250 mg by mouth 3 (Three) Times a Day., Disp: , Rfl:   •  buPROPion SR (WELLBUTRIN SR) 150 MG 12 hr tablet, , Disp: , Rfl:   •  celecoxib (CeleBREX) 200 MG capsule, , Disp: , Rfl:   •  Cholecalciferol (VITAMIN D) 2000 units tablet, , Disp: , Rfl:   •  doxepin (SINEquan) 10 MG capsule, , Disp: , Rfl:   •  famotidine (PEPCID) 20 MG tablet, Take 20 mg by mouth 2 (Two) Times a Day., Disp: , Rfl:   •  fluticasone (FLONASE) 50 MCG/ACT nasal spray, 2 sprays into the nostril(s) as directed by provider Daily., Disp: , Rfl:   •  gabapentin (NEURONTIN) 600 MG tablet, Take 600 mg by mouth 3 (Three) Times a Day., Disp: , Rfl:   •  methocarbamol (ROBAXIN) 500 MG tablet, Take 500 mg by mouth 3 (Three) Times a Day., Disp: , Rfl:   •  naratriptan (AMERGE) 2.5 MG tablet, Take 2.5 mg by mouth 1 (One) Time As Needed for Migraine. 2.5 mg at onset of headache, may repeat in 4 hours if needed, Disp: , Rfl:   •  Ocrelizumab 300 MG/10ML solution, Infuse  into a venous catheter Every 6 (Six) Months., Disp: , Rfl:   •  promethazine (PHENERGAN) 25 MG tablet, Take 25 mg by mouth Every 6 (Six) Hours As Needed for Nausea or Vomiting., Disp: , Rfl:   •   senna (SENOKOT) 8.6 MG tablet tablet, Take  by mouth Every Night., Disp: , Rfl:   •  sertraline (ZOLOFT) 100 MG tablet, Take 100 mg by mouth Daily., Disp: , Rfl:   •  simvastatin (ZOCOR) 20 MG tablet, Take 20 mg by mouth Every Night., Disp: , Rfl:       There were no vitals taken for this visit.    Physical Exam   Constitutional: She is oriented to person, place, and time. She appears well-developed and well-nourished. No distress.   HENT:   Head: Normocephalic and atraumatic.   Mouth/Throat: No oropharyngeal exudate.   Eyes: Conjunctivae and EOM are normal. Pupils are equal, round, and reactive to light.   Pulmonary/Chest: Effort normal. No respiratory distress.   Abdominal: Soft. She exhibits no distension.   Neurological: She is alert and oriented to person, place, and time. No cranial nerve deficit.   Skin: Skin is warm and dry. She is not diaphoretic. No pallor.   Psychiatric: She has a normal mood and affect. Her behavior is normal. Thought content normal.         Assessment:   Pursuing Weight Loss Surgery.    ICD-10-CM ICD-9-CM   1. MS (multiple sclerosis) (CMS/[facility]) G35 340   2. Morbid obesity (CMS/[facility]) E66.01 278.01   3. Gastroesophageal reflux disease, esophagitis presence not specified K21.9 530.81   4. Asthma, unspecified asthma severity, unspecified whether complicated, unspecified whether persistent J45.909 493.90   5. Arthralgia, unspecified joint M25.50 719.40   6. Lower extremity edema R60.0 782.3   7. Fatigue, unspecified type R53.83 780.79   8. Migraine without status migrainosus, not intractable, unspecified migraine type G43.909 346.90   9. Depression, unspecified depression type F32.9 311       Discussion/Plan:  During diet appointments the patient is educated on high quality nutrition and habits to facilitate good health and possibly some weight loss. Necessary lifestyle changes and behavior modifications were discussed. Please note, the patient remains compliant in completing her diet  requirements.     Goals:   1. Continue to be mindful of healthy food choices and portion control.   2. Increase daily protein intake and reduce carbs.  3. Increase daily exercise/activity as able.   4.  Must stop skipping meals.  Discussed the damage to metabolism.       Follow up in 1 month. Call w/ issues/concerns.

## 2019-02-28 ENCOUNTER — OFFICE VISIT (OUTPATIENT)
Dept: BARIATRICS/WEIGHT MGMT | Facility: CLINIC | Age: 34
End: 2019-02-28

## 2019-02-28 VITALS
BODY MASS INDEX: 44.07 KG/M2 | TEMPERATURE: 97.8 F | HEART RATE: 78 BPM | SYSTOLIC BLOOD PRESSURE: 122 MMHG | DIASTOLIC BLOOD PRESSURE: 76 MMHG | WEIGHT: 264.5 LBS | OXYGEN SATURATION: 99 % | HEIGHT: 65 IN | RESPIRATION RATE: 18 BRPM

## 2019-02-28 DIAGNOSIS — E66.01 OBESITY, CLASS III, BMI 40-49.9 (MORBID OBESITY) (HCC): Primary | ICD-10-CM

## 2019-02-28 PROCEDURE — 99213 OFFICE O/P EST LOW 20 MIN: CPT | Performed by: PHYSICIAN ASSISTANT

## 2019-03-29 ENCOUNTER — LAB (OUTPATIENT)
Dept: LAB | Facility: HOSPITAL | Age: 34
End: 2019-03-29

## 2019-03-29 ENCOUNTER — OFFICE VISIT (OUTPATIENT)
Dept: BARIATRICS/WEIGHT MGMT | Facility: CLINIC | Age: 34
End: 2019-03-29

## 2019-03-29 VITALS
TEMPERATURE: 97.8 F | SYSTOLIC BLOOD PRESSURE: 120 MMHG | RESPIRATION RATE: 18 BRPM | HEART RATE: 101 BPM | HEIGHT: 65 IN | DIASTOLIC BLOOD PRESSURE: 72 MMHG | WEIGHT: 272 LBS | BODY MASS INDEX: 45.32 KG/M2 | OXYGEN SATURATION: 99 %

## 2019-03-29 DIAGNOSIS — K21.9 GASTROESOPHAGEAL REFLUX DISEASE, ESOPHAGITIS PRESENCE NOT SPECIFIED: Primary | ICD-10-CM

## 2019-03-29 DIAGNOSIS — E66.01 MORBID OBESITY (HCC): Primary | ICD-10-CM

## 2019-03-29 DIAGNOSIS — G35 MS (MULTIPLE SCLEROSIS) (HCC): ICD-10-CM

## 2019-03-29 PROCEDURE — 87338 HPYLORI STOOL AG IA: CPT

## 2019-03-29 PROCEDURE — 99213 OFFICE O/P EST LOW 20 MIN: CPT | Performed by: PHYSICIAN ASSISTANT

## 2019-03-29 NOTE — PROGRESS NOTES
"[facility]ariat[facility]   [address]  [facility] [address] [phone]    Patient Name:  Darleen Barber.  :  1985      Date of Visit:  2019      Reason for Visit:  Monthly Diet Visit # 6     HPI:  Presents for monthly supervised diet visit.  Pursing LSG w/ Dr. Vargas.    Note from Dr. Vargas:  \"MS, dx 2016 with vision loss.  Has been on monoclonal ab (ocrelizumab) since 2017 which is working well, very few flares.  Takes this injection twice yearly and takes steroids with it each time.  Last injection was , next one will be in March.  Discussed likely doing surgery date in April, 1 month after next MAB injection (b/c takes steroids with it).  3-4 flares per year.  Followed by Dr. Corona at .  Limited mobility with imbalance.  Some left sided weakness.\"    Since last visit had MS treatment 3/18 w/ typical steroid dosing.  Says has Neurology clearance to proceed w/ WLS.  Doing fine o/w.  Drinking 6 glasses water/day.  Still doing G2 gatorade.  Avoiding HFCS.  Eating 3x/day.      Exercise limited.  Ambulating w/ a cane.  Working 3 days/week, payment  @ [employer].      Initial weight: 267 lbs.  Current weight:  272 lbs - up 8 lbs since last visit.    Past Medical History:   Diagnosis Date   • Anxiety    • Asthma    • Coughing     worse at night   • Current smoker    • Depression     \"adjustment disorder\"   • Fatigue    • GERD (gastroesophageal reflux disease)     takes famotidine BID.   EGD 4 years ago.  Unknown findings. No h/o h pylori.    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  Worsened by falls. Uses cane. No surgeries.    • IBS (irritable bowel syndrome)     mixed diarrhea/ constipation   • Joint pain     hips, legs, shoulder, lower back.  Celebrex, muscle relaxers, gabapentin. No steroid injections.    • Lower extremity edema    • Migraines    • Morbid obesity with BMI of 40.0-44.9, adult (CMS/[facility])    • MS (multiple sclerosis) (CMS/[facility])     Limits mobility " with imbalance.  Diagnosed 2016 with loss of vision.  3-4 flares a year requiring ED or hospitalization, treated with steroids.     • Optic neuritis    • Pseudotumor cerebri     followed by Neurology, on Diamox   • S/P jack     acalculous    • Seasonal allergies    • Short-term memory loss     secondary to MS   • SOB (shortness of breath) on exertion      Past Surgical History:   Procedure Laterality Date   • COLONOSCOPY  2014    unremarkable   • DILATATION AND CURETTAGE     • EAR TUBES  1988   • LAPAROSCOPIC CHOLECYSTECTOMY  2013    acalculous   • LUMBAR PUNCTURE  2016, 2018    diagnostic testing for MS, removing CSF   • TONSILLECTOMY  1989       Allergies   Allergen Reactions   • Sulfa Antibiotics Other (See Comments)     Very hyperactive         Current Outpatient Medications:   •  acetaZOLAMIDE (DIAMOX) 250 MG tablet, Take 250 mg by mouth 3 (Three) Times a Day., Disp: , Rfl:   •  buPROPion SR (WELLBUTRIN SR) 150 MG 12 hr tablet, , Disp: , Rfl:   •  celecoxib (CeleBREX) 200 MG capsule, , Disp: , Rfl:   •  Cholecalciferol (VITAMIN D) 2000 units tablet, , Disp: , Rfl:   •  doxepin (SINEquan) 10 MG capsule, , Disp: , Rfl:   •  fluticasone (FLONASE) 50 MCG/ACT nasal spray, 2 sprays into the nostril(s) as directed by provider Daily., Disp: , Rfl:   •  gabapentin (NEURONTIN) 600 MG tablet, Take 600 mg by mouth 3 (Three) Times a Day., Disp: , Rfl:   •  methocarbamol (ROBAXIN) 500 MG tablet, Take 500 mg by mouth 3 (Three) Times a Day., Disp: , Rfl:   •  naratriptan (AMERGE) 2.5 MG tablet, Take 2.5 mg by mouth 1 (One) Time As Needed for Migraine. 2.5 mg at onset of headache, may repeat in 4 hours if needed, Disp: , Rfl:   •  Ocrelizumab 300 MG/10ML solution, Infuse  into a venous catheter Every 6 (Six) Months., Disp: , Rfl:   •  pantoprazole (PROTONIX) 20 MG EC tablet, Take 20 mg by mouth Daily., Disp: , Rfl:   •  promethazine (PHENERGAN) 25 MG tablet, Take 25 mg by mouth Every 6 (Six) Hours As Needed for Nausea or  "Vomiting., Disp: , Rfl:   •  senna (SENOKOT) 8.6 MG tablet tablet, Take  by mouth Every Night., Disp: , Rfl:   •  sertraline (ZOLOFT) 100 MG tablet, Take 100 mg by mouth Daily., Disp: , Rfl:   •  simvastatin (ZOCOR) 20 MG tablet, Take 20 mg by mouth Every Night., Disp: , Rfl:       /72 (BP Location: Left arm, Patient Position: Sitting, Cuff Size: Large Adult)   Pulse 101   Temp 97.8 °F (36.6 °C) (Temporal)   Resp 18   Ht 163.8 cm (64.5\")   Wt 123 kg (272 lb)   SpO2 99%   BMI 45.97 kg/m²     Physical Exam   Constitutional: She appears well-developed and well-nourished.   Cardiovascular: Normal rate.   Pulmonary/Chest: Effort normal.   Musculoskeletal:   ambulating w/ a cane   Neurological: She is alert.   Psychiatric: She has a normal mood and affect. Judgment and thought content normal.         Assessment:   Pursuing Weight Loss Surgery.    ICD-10-CM ICD-9-CM   1. Morbid obesity (CMS/H[facility]) E66.01 278.01   2. MS (multiple sclerosis) (CMS/[facility]) G35 340       Discussion/Plan:  During diet appointments the patient is educated on high quality nutrition and habits to facilitate good health and possibly some weight loss. Necessary lifestyle changes and behavior modifications were discussed. Please note, the patient remains compliant in completing her diet requirements.     Goals:  Continue to be mindful of healthy habits.     Call w/ issues/concerns.       "

## 2019-04-01 LAB — H PYLORI AG STL QL IA: NEGATIVE

## 2019-04-08 DIAGNOSIS — R06.00 DYSPNEA, UNSPECIFIED TYPE: ICD-10-CM

## 2019-04-08 DIAGNOSIS — F17.200 SMOKER: ICD-10-CM

## 2019-04-08 DIAGNOSIS — J45.909 ASTHMA, UNSPECIFIED ASTHMA SEVERITY, UNSPECIFIED WHETHER COMPLICATED, UNSPECIFIED WHETHER PERSISTENT: Primary | ICD-10-CM

## 2019-05-13 ENCOUNTER — HOSPITAL ENCOUNTER (OUTPATIENT)
Dept: PULMONOLOGY | Facility: HOSPITAL | Age: 34
Discharge: HOME | End: 2019-05-13
Admitting: PHYSICIAN ASSISTANT

## 2019-05-13 DIAGNOSIS — J45.909 ASTHMA, UNSPECIFIED ASTHMA SEVERITY, UNSPECIFIED WHETHER COMPLICATED, UNSPECIFIED WHETHER PERSISTENT: ICD-10-CM

## 2019-05-13 DIAGNOSIS — R06.00 DYSPNEA, UNSPECIFIED TYPE: ICD-10-CM

## 2019-05-13 DIAGNOSIS — F17.200 SMOKER: ICD-10-CM

## 2019-05-13 PROCEDURE — 94060 EVALUATION OF WHEEZING: CPT | Performed by: INTERNAL MEDICINE

## 2019-05-13 PROCEDURE — 94060 EVALUATION OF WHEEZING: CPT

## 2019-05-13 PROCEDURE — 94727 GAS DIL/WSHOT DETER LNG VOL: CPT

## 2019-05-13 PROCEDURE — 94729 DIFFUSING CAPACITY: CPT | Performed by: INTERNAL MEDICINE

## 2019-05-13 PROCEDURE — 94729 DIFFUSING CAPACITY: CPT

## 2019-05-13 PROCEDURE — 94727 GAS DIL/WSHOT DETER LNG VOL: CPT | Performed by: INTERNAL MEDICINE

## 2019-08-13 ENCOUNTER — TELEPHONE (OUTPATIENT)
Dept: NEUROLOGY | Facility: CLINIC | Age: 34
End: 2019-08-13

## 2020-12-14 ENCOUNTER — OFFICE VISIT (OUTPATIENT)
Dept: ORTHOPEDIC SURGERY | Facility: CLINIC | Age: 35
End: 2020-12-14

## 2020-12-14 VITALS — BODY MASS INDEX: 43.49 KG/M2 | HEART RATE: 88 BPM | WEIGHT: 261 LBS | HEIGHT: 65 IN | OXYGEN SATURATION: 100 %

## 2020-12-14 DIAGNOSIS — M25.552 BILATERAL HIP PAIN: Primary | ICD-10-CM

## 2020-12-14 DIAGNOSIS — Q65.89 HIP DYSPLASIA: ICD-10-CM

## 2020-12-14 DIAGNOSIS — M25.551 BILATERAL HIP PAIN: Primary | ICD-10-CM

## 2020-12-14 DIAGNOSIS — E66.01 OBESITY, MORBID, BMI 40.0-49.9 (HCC): ICD-10-CM

## 2020-12-14 PROCEDURE — 99204 OFFICE O/P NEW MOD 45 MIN: CPT | Performed by: ORTHOPAEDIC SURGERY

## 2020-12-14 RX ORDER — CYCLOBENZAPRINE HCL 5 MG
TABLET ORAL
COMMUNITY
Start: 2020-11-03

## 2020-12-14 RX ORDER — BACLOFEN 20 MG/1
TABLET ORAL
COMMUNITY
Start: 2020-12-02

## 2020-12-14 RX ORDER — DULOXETIN HYDROCHLORIDE 30 MG/1
CAPSULE, DELAYED RELEASE ORAL
COMMUNITY
Start: 2020-11-28

## 2020-12-14 RX ORDER — PREGABALIN 150 MG/1
CAPSULE ORAL
COMMUNITY
Start: 2020-11-02

## 2020-12-14 RX ORDER — DALFAMPRIDINE 10 MG/1
TABLET, FILM COATED, EXTENDED RELEASE ORAL
COMMUNITY
Start: 2020-11-12

## 2020-12-14 RX ORDER — LORATADINE 10 MG/1
TABLET ORAL
COMMUNITY
Start: 2020-11-02

## 2020-12-14 NOTE — PROGRESS NOTES
"    [facility] Orthopaedic Surgery Clinic Note    Subjective     Chief Complaint   Patient presents with   • Right Hip - Pain   • Left Hip - Pain        HPI  Darleen Barber is a 35 y.o. female who presents with new problem of: bilateral hip pain.  Onset: atraumatic and gradual in nature. The issue has been ongoing for 3 year(s). Pain is a 9/10 on the pain scale. Pain is described as aching, burning, throbbing and stabbing. Associated symptoms include pain, stiffness and giving way/buckling. The pain is worse with walking, standing, sitting, climbing stairs, working and leisure; resting improve the pain. Previous treatments have included: cane/walker, NSAIDS, physical therapy and weight loss.    I have reviewed the following portions of the patient's history:History of Present Illness and review of systems.          Past Medical History:   Diagnosis Date   • Anxiety    • Asthma    • Coughing     worse at night   • Current smoker    • Depression     \"adjustment disorder\"   • Fatigue    • GERD (gastroesophageal reflux disease)     takes famotidine BID.   EGD 4 years ago.  Unknown findings. No h/o h pylori.    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  Worsened by falls. Uses cane. No surgeries.    • IBS (irritable bowel syndrome)     mixed diarrhea/ constipation   • Joint pain     hips, legs, shoulder, lower back.  Celebrex, muscle relaxers, gabapentin. No steroid injections.    • Lower extremity edema    • Migraines    • Morbid obesity with BMI of 40.0-44.9, adult (CMS/[facility])    • MS (multiple sclerosis) (CMS/[facility])     Limits mobility with imbalance.  Diagnosed 2016 with loss of vision.  3-4 flares a year requiring ED or hospitalization, treated with steroids.     • Optic neuritis    • Pseudotumor cerebri     followed by Neurology, on Diamox   • S/P jack     acalculous    • Seasonal allergies    • Short-term memory loss     secondary to MS   • SOB (shortness of breath) on exertion       Past Surgical History:   Procedure Laterality Date   • " COLONOSCOPY  2014    unremarkable   • DILATATION AND CURETTAGE     • EAR TUBES  1988   • LAPAROSCOPIC CHOLECYSTECTOMY  2013    acalculous   • LUMBAR PUNCTURE  2016, 2018    diagnostic testing for MS, removing CSF   • TONSILLECTOMY  1989      Family History   Problem Relation Age of Onset   • Obesity Mother    • Sleep apnea Mother    • Heart attack Maternal Grandmother    • Obesity Maternal Grandmother    • Hypertension Maternal Grandmother    • Heart disease Maternal Grandmother    • Sleep apnea Maternal Grandmother    • Obesity Maternal Grandfather    • Diabetes Maternal Grandfather    • Hypertension Maternal Grandfather    • Heart attack Maternal Grandfather    • Heart disease Maternal Grandfather    • Sleep apnea Maternal Grandfather    • Pneumonia Maternal Grandfather      Social History     Socioeconomic History   • Marital status: Single     Spouse name: Not on file   • Number of children: Not on file   • Years of education: Not on file   • Highest education level: Not on file   Tobacco Use   • Smoking status: Current Every Day Smoker     Packs/day: 0.25     Types: Cigarettes   • Smokeless tobacco: Never Used   • Tobacco comment: light, daily smoker   Substance and Sexual Activity   • Alcohol use: No   • Drug use: Yes     Types: Marijuana     Comment: not as much   • Sexual activity: Defer     Comment: no hormones   Social History Narrative    Lives in [place] with mother and roommate (ex- girlfriend). 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        Current Outpatient Medications on File Prior to Visit   Medication Sig Dispense Refill   • acetaZOLAMIDE (DIAMOX) 250 MG tablet Take 250 mg by mouth 3 (Three) Times a Day.     • baclofen (LIORESAL) 20 MG tablet      • buPROPion SR (WELLBUTRIN SR) 150 MG 12 hr tablet      • Cholecalciferol (VITAMIN D) 2000 units tablet      • cyclobenzaprine (FLEXERIL) 5 MG tablet      • Dalfampridine ER 10 MG tablet sustained-release 12 hour      • doxepin (SINEquan) 10 MG  capsule      • DULoxetine (CYMBALTA) 30 MG capsule      • fluticasone (FLONASE) 50 MCG/ACT nasal spray 2 sprays into the nostril(s) as directed by provider Daily.     • loratadine (CLARITIN) 10 MG tablet      • naratriptan (AMERGE) 2.5 MG tablet Take 2.5 mg by mouth 1 (One) Time As Needed for Migraine. 2.5 mg at onset of headache, may repeat in 4 hours if needed     • Ocrelizumab 300 MG/10ML solution Infuse  into a venous catheter Every 6 (Six) Months.     • pantoprazole (PROTONIX) 20 MG EC tablet Take 20 mg by mouth Daily.     • pregabalin (LYRICA) 150 MG capsule      • promethazine (PHENERGAN) 25 MG tablet Take 25 mg by mouth Every 6 (Six) Hours As Needed for Nausea or Vomiting.     • senna (SENOKOT) 8.6 MG tablet tablet Take  by mouth Every Night.     • sertraline (ZOLOFT) 100 MG tablet Take 100 mg by mouth Daily.     • simvastatin (ZOCOR) 20 MG tablet Take 20 mg by mouth Every Night.     • celecoxib (CeleBREX) 200 MG capsule      • gabapentin (NEURONTIN) 600 MG tablet Take 600 mg by mouth 3 (Three) Times a Day.     • methocarbamol (ROBAXIN) 500 MG tablet Take 500 mg by mouth 3 (Three) Times a Day.       No current facility-administered medications on file prior to visit.       Allergies   Allergen Reactions   • Sulfa Antibiotics Other (See Comments)     Very hyperactive        The following portions of the patient's history were reviewed and updated as appropriate: allergies, current medications, past family history, past medical history, past social history, past surgical history and problem list.    Review of Systems   Constitutional: Negative.    HENT: Negative.    Eyes: Negative.    Respiratory: Negative.    Cardiovascular: Negative.    Gastrointestinal: Negative.    Endocrine: Negative.    Genitourinary: Negative.    Musculoskeletal: Positive for arthralgias.   Skin: Negative.    Allergic/Immunologic: Negative.    Neurological: Negative.    Hematological: Negative.    Psychiatric/Behavioral: Negative.      "    Objective      Physical Exam  Pulse 88   Ht 165.1 cm (65\")   Wt 118 kg (261 lb)   SpO2 100%   BMI 43.43 kg/m²     Body mass index is 43.43 kg/m².    GENERAL APPEARANCE: awake, alert & oriented x 3, in no acute distress and well developed, well nourished  PSYCH: normal mood and affect  LUNGS:  breathing nonlabored, no wheezing  EYES: sclera anicteric, pupils equal  CARDIOVASCULAR: palpable pulses. Capillary refill less than 2 seconds  INTEGUMENTARY: skin intact, no clubbing, cyanosis  NEUROLOGIC:  Normal Sensation and reflexes       Ortho Exam  Peripheral Vascular  Lower Extremity   Edema - None bilaterally    Musculoskeletal  Lower Extremity   Right Hip    No instability, subluxation or laxity    No known fractures or dislocations    Normal Sensation and coordination   Inspection and palpation    Tenderness - none    Tissue tension/texture is pliable and soft    Normal warmth   Strength and Tone    Left hip flexors - 4/5    Range of Motion    Internal Rotation: PROM - 60    External Rotation: PROM - 60   Deformities/Postures/Misalignments/Discrepancies    No leg length discrepancy   Functional Testing    Stinchfield test positive    90-90 straight leg raise negative      Peripheral Vascular  Lower Extremity   Edema - None bilaterally    Musculoskeletal  Lower Extremity   Left Hip    No instability, subluxation or laxity    No known fractures or dislocations    Normal Sensation and coordination   Inspection and palpation    Tenderness - none    Tissue tension/texture is pliable and soft    Normal warmth   Strength and Tone    Left hip flexors - 4/5    Range of Motion    Internal Rotation: PROM - 60    External Rotation: PROM - 60   Deformities/Postures/Misalignments/Discrepancies    No leg length discrepancy   Functional Testing    Stinchfield test positive    90-90 straight leg raise negative        Imaging/Studies  Imaging Results (Last 7 Days)     ** No results found for the last 168 hours. **      I " viewed her x-rays from today.  X-rays show hip dysplasia in both hips  Assessment/Plan        ICD-10-CM ICD-9-CM   1. Bilateral hip pain  M25.551 719.45    M25.552    2. Hip dysplasia  Q65.89 755.63   3. Obesity, morbid, BMI 40.0-49.9 (CMS/[facility])  E66.01 278.01       Orders Placed This Encounter   Procedures   • XR Hips Bilateral With or Without Pelvis 3-4 View   • FL Guide For Pain Meds Inj   • FL Injection Hip Left   • FL Guide For Pain Meds Inj   • FL Injection Hip Left      The plan of your fluoroscopy injections for both hips.  I will see her back after the injections.  Medical Decision Making  Management Options : prescription/IM medicine  Data/Risk: radiology tests and independent visualization of imaging, lab tests, or EMG/NCV    Yehuda Torres MD  12/14/20  15:58 EST         EMR Dragon/Transcription disclaimer:  Much of this encounter note is an electronic transcription of spoken language to printed text. Electronic transcription of spoken language may permit erroneous, or at times, nonsensical words or phrases to be inadvertently transcribed. Although I have reviewed the note for such errors, some may still exist.

## 2021-01-11 ENCOUNTER — HOSPITAL ENCOUNTER (OUTPATIENT)
Dept: GENERAL RADIOLOGY | Facility: HOSPITAL | Age: 36
Discharge: HOME | End: 2021-01-11

## 2021-01-11 DIAGNOSIS — Q65.89 HIP DYSPLASIA: ICD-10-CM

## 2021-01-11 DIAGNOSIS — M25.551 BILATERAL HIP PAIN: ICD-10-CM

## 2021-01-11 DIAGNOSIS — E66.01 OBESITY, MORBID, BMI 40.0-49.9 (HCC): ICD-10-CM

## 2021-01-11 DIAGNOSIS — M25.552 BILATERAL HIP PAIN: ICD-10-CM

## 2021-01-11 PROCEDURE — 25010000002 BETAMETHASONE ACET & SOD PHOS PER 4 MG: Performed by: ORTHOPAEDIC SURGERY

## 2021-01-11 PROCEDURE — 77002 NEEDLE LOCALIZATION BY XRAY: CPT

## 2021-01-11 RX ORDER — BETAMETHASONE SODIUM PHOSPHATE AND BETAMETHASONE ACETATE 3; 3 MG/ML; MG/ML
6 INJECTION, SUSPENSION INTRA-ARTICULAR; INTRALESIONAL; INTRAMUSCULAR; SOFT TISSUE ONCE
Status: COMPLETED | OUTPATIENT
Start: 2021-01-11 | End: 2021-01-11

## 2021-01-11 RX ORDER — LIDOCAINE HYDROCHLORIDE 10 MG/ML
5 INJECTION, SOLUTION EPIDURAL; INFILTRATION; INTRACAUDAL; PERINEURAL ONCE
Status: COMPLETED | OUTPATIENT
Start: 2021-01-11 | End: 2021-01-11

## 2021-01-11 RX ORDER — LIDOCAINE HYDROCHLORIDE 10 MG/ML
4 INJECTION, SOLUTION EPIDURAL; INFILTRATION; INTRACAUDAL; PERINEURAL ONCE
Status: COMPLETED | OUTPATIENT
Start: 2021-01-11 | End: 2021-01-11

## 2021-01-11 RX ORDER — BUPIVACAINE HYDROCHLORIDE 2.5 MG/ML
4 INJECTION, SOLUTION EPIDURAL; INFILTRATION; INTRACAUDAL ONCE
Status: COMPLETED | OUTPATIENT
Start: 2021-01-11 | End: 2021-01-11

## 2021-01-11 RX ADMIN — BUPIVACAINE HYDROCHLORIDE 4 ML: 2.5 INJECTION, SOLUTION EPIDURAL; INFILTRATION; INTRACAUDAL; PERINEURAL at 14:49

## 2021-01-11 RX ADMIN — BETAMETHASONE SODIUM PHOSPHATE AND BETAMETHASONE ACETATE 6 MG: 3; 3 INJECTION, SUSPENSION INTRA-ARTICULAR; INTRALESIONAL; INTRAMUSCULAR at 14:47

## 2021-01-11 RX ADMIN — LIDOCAINE HYDROCHLORIDE 4 ML: 10 INJECTION, SOLUTION EPIDURAL; INFILTRATION; INTRACAUDAL; PERINEURAL at 14:47

## 2021-01-11 RX ADMIN — LIDOCAINE HYDROCHLORIDE 5 ML: 10 INJECTION, SOLUTION EPIDURAL; INFILTRATION; INTRACAUDAL; PERINEURAL at 14:49

## 2021-01-11 RX ADMIN — BETAMETHASONE SODIUM PHOSPHATE AND BETAMETHASONE ACETATE 6 MG: 3; 3 INJECTION, SUSPENSION INTRA-ARTICULAR; INTRALESIONAL; INTRAMUSCULAR at 14:49

## 2021-01-11 RX ADMIN — LIDOCAINE HYDROCHLORIDE 5 ML: 10 INJECTION, SOLUTION EPIDURAL; INFILTRATION; INTRACAUDAL; PERINEURAL at 14:47

## 2021-01-11 RX ADMIN — LIDOCAINE HYDROCHLORIDE 4 ML: 10 INJECTION, SOLUTION EPIDURAL; INFILTRATION; INTRACAUDAL; PERINEURAL at 14:49

## 2021-01-11 RX ADMIN — BUPIVACAINE HYDROCHLORIDE 4 ML: 2.5 INJECTION, SOLUTION EPIDURAL; INFILTRATION; INTRACAUDAL; PERINEURAL at 14:47

## 2021-01-14 ENCOUNTER — TELEPHONE (OUTPATIENT)
Dept: ORTHOPEDIC SURGERY | Facility: CLINIC | Age: 36
End: 2021-01-14

## 2021-01-14 NOTE — TELEPHONE ENCOUNTER
PATIENT CALLED AND STATED SHE HAD INJECTIONS IN BOTH HIPS ON Monday AND IS IN EXTREME PAIN. PATIENT STATES SHE HAS TAKEN TYLENOL, IBUPROFEN WITH NO RELIEVE. THE AREA IS ALS SWOLLEN. PATIENT CAN BE REACHED @ [phone]

## 2021-01-14 NOTE — TELEPHONE ENCOUNTER
I spoke with Ms. Barber to let her know to use ice and continue taking over the counter pain medication.   I let her know that it may take a few days for the injection to start helping. She denied any fever and redness with the swelling. I told her to call us back if she has any worseing symptoms.

## 2021-04-22 ENCOUNTER — APPOINTMENT (OUTPATIENT)
Dept: GENERAL RADIOLOGY | Facility: HOSPITAL | Age: 36
End: 2021-04-22

## 2021-04-22 ENCOUNTER — HOSPITAL ENCOUNTER (EMERGENCY)
Facility: HOSPITAL | Age: 36
Discharge: HOME | End: 2021-04-22
Attending: EMERGENCY MEDICINE | Admitting: EMERGENCY MEDICINE

## 2021-04-22 VITALS
OXYGEN SATURATION: 95 % | TEMPERATURE: 98.3 F | DIASTOLIC BLOOD PRESSURE: 77 MMHG | SYSTOLIC BLOOD PRESSURE: 129 MMHG | WEIGHT: 265 LBS | HEIGHT: 66 IN | HEART RATE: 95 BPM | RESPIRATION RATE: 16 BRPM | BODY MASS INDEX: 42.59 KG/M2

## 2021-04-22 DIAGNOSIS — S93.402A SPRAIN OF LEFT ANKLE, UNSPECIFIED LIGAMENT, INITIAL ENCOUNTER: Primary | ICD-10-CM

## 2021-04-22 PROCEDURE — 73610 X-RAY EXAM OF ANKLE: CPT

## 2021-04-22 PROCEDURE — 63710000001 ONDANSETRON PER 8 MG: Performed by: EMERGENCY MEDICINE

## 2021-04-22 PROCEDURE — 99283 EMERGENCY DEPT VISIT LOW MDM: CPT

## 2021-04-22 PROCEDURE — 73630 X-RAY EXAM OF FOOT: CPT

## 2021-04-22 RX ORDER — HYDROCODONE BITARTRATE AND ACETAMINOPHEN 5; 325 MG/1; MG/1
1 TABLET ORAL ONCE
Status: COMPLETED | OUTPATIENT
Start: 2021-04-22 | End: 2021-04-22

## 2021-04-22 RX ORDER — ONDANSETRON 4 MG/1
4 TABLET, FILM COATED ORAL ONCE
Status: COMPLETED | OUTPATIENT
Start: 2021-04-22 | End: 2021-04-22

## 2021-04-22 RX ADMIN — ONDANSETRON HYDROCHLORIDE 4 MG: 4 TABLET, FILM COATED ORAL at 12:39

## 2021-04-22 RX ADMIN — HYDROCODONE BITARTRATE AND ACETAMINOPHEN 1 TABLET: 5; 325 TABLET ORAL at 12:39

## 2021-04-22 NOTE — ED PROVIDER NOTES
"Subjective   Patient presents to the emergency department with complaint of left ankle and left dorsal foot pain onset Saturday when she fell and her friend fell upon her foot as well.  Patient states she has performed ice and elevation together with use of Tylenol Motrin without relief.  Her presenting pain is causing significant insomnia.  She denies any hip pain or fever or recent illness      History provided by:  Patient  Ankle Injury  Location:  Left lateral ankle and dorsal foot  Quality:  Aching  Severity:  Moderate  Onset quality:  Sudden  Duration:  5 days  Timing:  Constant  Progression:  Worsening  Chronicity:  New  Associated symptoms: no chest pain, no loss of consciousness, no shortness of breath and no vomiting        Review of Systems   Respiratory: Negative for shortness of breath.    Cardiovascular: Negative for chest pain.   Gastrointestinal: Negative for vomiting.   Musculoskeletal:        Specific left ankle and left foot injury.   Neurological: Negative for loss of consciousness.   All other systems reviewed and are negative.      Past Medical History:   Diagnosis Date   • Anxiety    • Asthma    • Coughing     worse at night   • Current smoker    • Depression     \"adjustment disorder\"   • Fatigue    • GERD (gastroesophageal reflux disease)     takes famotidine BID.   EGD 4 years ago.  Unknown findings. No h/o h pylori.    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  Worsened by falls. Uses cane. No surgeries.    • IBS (irritable bowel syndrome)     mixed diarrhea/ constipation   • Joint pain     hips, legs, shoulder, lower back.  Celebrex, muscle relaxers, gabapentin. No steroid injections.    • Lower extremity edema    • Migraines    • Morbid obesity with BMI of 40.0-44.9, adult (CMS/[facility])    • MS (multiple sclerosis) (CMS/[facility])     Limits mobility with imbalance.  Diagnosed 2016 with loss of vision.  3-4 flares a year requiring ED or hospitalization, treated with steroids.     • Optic neuritis    • " Pseudotumor cerebri     followed by Neurology, on Diamox   • S/P jack     acalculous    • Seasonal allergies    • Short-term memory loss     secondary to MS   • SOB (shortness of breath) on exertion        Allergies   Allergen Reactions   • Sulfa Antibiotics Other (See Comments)     Very hyperactive       Past Surgical History:   Procedure Laterality Date   • COLONOSCOPY  2014    unremarkable   • DILATATION AND CURETTAGE     • EAR TUBES  1988   • LAPAROSCOPIC CHOLECYSTECTOMY  2013    acalculous   • LUMBAR PUNCTURE  2016, 2018    diagnostic testing for MS, removing CSF   • TONSILLECTOMY  1989       Family History   Problem Relation Age of Onset   • Obesity Mother    • Sleep apnea Mother    • Heart attack Maternal Grandmother    • Obesity Maternal Grandmother    • Hypertension Maternal Grandmother    • Heart disease Maternal Grandmother    • Sleep apnea Maternal Grandmother    • Obesity Maternal Grandfather    • Diabetes Maternal Grandfather    • Hypertension Maternal Grandfather    • Heart attack Maternal Grandfather    • Heart disease Maternal Grandfather    • Sleep apnea Maternal Grandfather    • Pneumonia Maternal Grandfather        Social History     Socioeconomic History   • Marital status: Single     Spouse name: Not on file   • Number of children: Not on file   • Years of education: Not on file   • Highest education level: Not on file   Tobacco Use   • Smoking status: Current Every Day Smoker     Packs/day: 0.25     Types: Cigarettes   • Smokeless tobacco: Never Used   • Tobacco comment: light, daily smoker   Substance and Sexual Activity   • Alcohol use: No   • Drug use: Yes     Types: Marijuana     Comment: not as much   • Sexual activity: Defer     Comment: no hormones           Objective   Physical Exam  Vitals and nursing note reviewed.   Constitutional:       General: She is not in acute distress.     Appearance: Normal appearance. She is not ill-appearing.   HENT:      Head: Normocephalic.      Right  Ear: External ear normal.      Left Ear: External ear normal.      Nose: Nose normal.      Mouth/Throat:      Mouth: Mucous membranes are moist.      Pharynx: No oropharyngeal exudate.   Eyes:      Conjunctiva/sclera: Conjunctivae normal.   Cardiovascular:      Rate and Rhythm: Normal rate and regular rhythm.      Heart sounds: No murmur heard.     Pulmonary:      Effort: Pulmonary effort is normal. No respiratory distress.      Breath sounds: Normal breath sounds.   Abdominal:      General: Bowel sounds are normal. There is no distension.      Palpations: Abdomen is soft.      Tenderness: There is no abdominal tenderness.   Musculoskeletal:         General: No swelling, tenderness or deformity. Normal range of motion.      Cervical back: Normal range of motion and neck supple. No muscular tenderness.      Comments: Left lower extremity: Pelvis is stable.  Left ankle is normal to inspection.  There is no soft tissue swelling.  Pulses are normal.  Patient localizes her presenting discomfort in the lateral region together at the base of the fifth metatarsal.  Proximal distal joints are negative.  Neurovascular exam is negative.  Pain is out of proportion to physical findings   Skin:     General: Skin is warm and dry.      Capillary Refill: Capillary refill takes less than 2 seconds.      Coloration: Skin is not pale.      Findings: No lesion.   Neurological:      General: No focal deficit present.      Mental Status: She is alert and oriented to person, place, and time.      Comments: No Neurosensory deficit or focal weakness     Psychiatric:         Behavior: Behavior normal.         Thought Content: Thought content normal.         Procedures           ED Course  ED Course as of Apr 22 1313   Thu Apr 22, 2021   1310 Patient's imaging is negative for acute findings.  Discussed parameters for concern that would warrant return to the emergency department with close follow-up with orthopedics,      [MS]      ED Course  "User Index  [MS] Teresa Younger, SUZIE     No results found for this or any previous visit (from the past 24 hour(s)).  Note: In addition to lab results from this visit, the labs listed above may include labs taken at another facility or during a different encounter within the last 24 hours. Please correlate lab times with ED admission and discharge times for further clarification of the services performed during this visit.    XR Foot 3+ View Left   Preliminary Result   No acute osseous findings of the left ankle or left foot   specifically no acute displaced fracture with ankle mortise symmetric.       D:  04/22/2021   E:  04/22/2021          XR Ankle 3+ View Left   Preliminary Result   No acute osseous findings of the left ankle or left foot   specifically no acute displaced fracture with ankle mortise symmetric.       D:  04/22/2021   E:  04/22/2021            Vitals:    04/22/21 1158   BP: 129/77   BP Location: Left arm   Patient Position: Sitting   Pulse: 95   Resp: 16   Temp: 98.3 °F (36.8 °C)   TempSrc: Infrared   SpO2: 95%   Weight: 120 kg (265 lb)   Height: 167.6 cm (66\")     Medications   HYDROcodone-acetaminophen (NORCO) 5-325 MG per tablet 1 tablet (1 tablet Oral Given 4/22/21 1239)   ondansetron (ZOFRAN) tablet 4 mg (4 mg Oral Given 4/22/21 1239)     ECG/EMG Results (last 24 hours)     ** No results found for the last 24 hours. **        No orders to display                                            MDM    Final diagnoses:   Sprain of left ankle, unspecified ligament, initial encounter       ED Disposition  ED Disposition     ED Disposition Condition Comment    Discharge Stable           Leonel Bryson MD  [address] [address]  [phone]               Medication List      No changes were made to your prescriptions during this visit.          Teresa Younger APRN  04/22/21 1313    "

## 2021-04-22 NOTE — DISCHARGE INSTRUCTIONS
Home to rest.  Continue your usual medications.  Avoid aspirin-containing products.  Continue Tylenol or Motrin as needed for discomfort.  Call the office of orthopedic surgeon, Dr. Bryson for persistent pain and to monitor your recovery.  Thank you

## 2021-06-25 ENCOUNTER — OFFICE VISIT (OUTPATIENT)
Dept: ORTHOPEDIC SURGERY | Facility: CLINIC | Age: 36
End: 2021-06-25

## 2021-06-25 VITALS
HEART RATE: 83 BPM | SYSTOLIC BLOOD PRESSURE: 115 MMHG | HEIGHT: 66 IN | BODY MASS INDEX: 42.52 KG/M2 | WEIGHT: 264.55 LBS | DIASTOLIC BLOOD PRESSURE: 58 MMHG

## 2021-06-25 DIAGNOSIS — M25.571 RIGHT ANKLE PAIN, UNSPECIFIED CHRONICITY: Primary | ICD-10-CM

## 2021-06-25 DIAGNOSIS — S99.911A RIGHT ANKLE INJURY, INITIAL ENCOUNTER: ICD-10-CM

## 2021-06-25 PROCEDURE — 99214 OFFICE O/P EST MOD 30 MIN: CPT | Performed by: PHYSICIAN ASSISTANT

## 2021-06-25 RX ORDER — HYDROCODONE BITARTRATE AND ACETAMINOPHEN 5; 325 MG/1; MG/1
TABLET ORAL
COMMUNITY
Start: 2021-05-10

## 2021-06-25 NOTE — PROGRESS NOTES
"    [facility] [place] [facility] Note        Subjective     Pain of the Left Ankle      HPI    Darleen Barber is a 35 y.o. female. Patient presents today to discuss her left ankle pain since an injury in April 2021. He complains of generalized ankle pain and heel pain. He has pain with weightbearing and walking as well as rest pain or night pain. He reports pain is moderate to severe and always ache. It is worse with weightbearing and cold air. She reports he continues to swell. She has been seen by her primary care physician taking ibuprofen and Tylenol. She has been referred to physical therapy and is here for further evaluation treatment conditions.    Past Medical History:   Diagnosis Date   • Anxiety    • Asthma    • Coughing     worse at night   • Current smoker    • Depression     \"adjustment disorder\"   • Fatigue    • GERD (gastroesophageal reflux disease)     takes famotidine BID.   EGD 4 years ago.  Unknown findings. No h/o h pylori.    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  Worsened by falls. Uses cane. No surgeries.    • IBS (irritable bowel syndrome)     mixed diarrhea/ constipation   • Joint pain     hips, legs, shoulder, lower back.  Celebrex, muscle relaxers, gabapentin. No steroid injections.    • Lower extremity edema    • Migraines    • Morbid obesity with BMI of 40.0-44.9, adult (CMS/[facility])    • MS (multiple sclerosis) (CMS/[facility])     Limits mobility with imbalance.  Diagnosed 2016 with loss of vision.  3-4 flares a year requiring ED or hospitalization, treated with steroids.     • Optic neuritis    • Pseudotumor cerebri     followed by Neurology, on Diamox   • S/P jack     acalculous    • Seasonal allergies    • Short-term memory loss     secondary to MS   • SOB (shortness of breath) on exertion       Past Surgical History:   Procedure Laterality Date   • COLONOSCOPY  2014    unremarkable   • DILATATION AND CURETTAGE     • EAR TUBES  1988   • LAPAROSCOPIC CHOLECYSTECTOMY  2013    acalculous   • " LUMBAR PUNCTURE  2016, 2018    diagnostic testing for MS, removing CSF   • TONSILLECTOMY  1989      Family History   Problem Relation Age of Onset   • Obesity Mother    • Sleep apnea Mother    • Heart attack Maternal Grandmother    • Obesity Maternal Grandmother    • Hypertension Maternal Grandmother    • Heart disease Maternal Grandmother    • Sleep apnea Maternal Grandmother    • Obesity Maternal Grandfather    • Diabetes Maternal Grandfather    • Hypertension Maternal Grandfather    • Heart attack Maternal Grandfather    • Heart disease Maternal Grandfather    • Sleep apnea Maternal Grandfather    • Pneumonia Maternal Grandfather      Social History     Socioeconomic History   • Marital status: Single     Spouse name: Not on file   • Number of children: Not on file   • Years of education: Not on file   • Highest education level: Not on file   Tobacco Use   • Smoking status: Current Every Day Smoker     Packs/day: 0.25     Types: Cigarettes   • Smokeless tobacco: Never Used   • Tobacco comment: light, daily smoker   Substance and Sexual Activity   • Alcohol use: No   • Drug use: Yes     Types: Marijuana     Comment: not as much   • Sexual activity: Defer     Comment: no hormones      Current Outpatient Medications on File Prior to Visit   Medication Sig Dispense Refill   • acetaZOLAMIDE (DIAMOX) 250 MG tablet Take 250 mg by mouth 3 (Three) Times a Day.     • baclofen (LIORESAL) 20 MG tablet      • buPROPion SR (WELLBUTRIN SR) 150 MG 12 hr tablet      • celecoxib (CeleBREX) 200 MG capsule      • Cholecalciferol (VITAMIN D) 2000 units tablet      • cyclobenzaprine (FLEXERIL) 5 MG tablet      • Dalfampridine ER 10 MG tablet sustained-release 12 hour      • doxepin (SINEquan) 10 MG capsule      • DULoxetine (CYMBALTA) 30 MG capsule      • fluticasone (FLONASE) 50 MCG/ACT nasal spray 2 sprays into the nostril(s) as directed by provider Daily.     • gabapentin (NEURONTIN) 600 MG tablet Take 600 mg by mouth 3 (Three)  "Times a Day.     • HYDROcodone-acetaminophen (NORCO) 5-325 MG per tablet      • loratadine (CLARITIN) 10 MG tablet      • methocarbamol (ROBAXIN) 500 MG tablet Take 500 mg by mouth 3 (Three) Times a Day.     • naratriptan (AMERGE) 2.5 MG tablet Take 2.5 mg by mouth 1 (One) Time As Needed for Migraine. 2.5 mg at onset of headache, may repeat in 4 hours if needed     • Ocrelizumab 300 MG/10ML solution Infuse  into a venous catheter Every 6 (Six) Months.     • pantoprazole (PROTONIX) 20 MG EC tablet Take 20 mg by mouth Daily.     • pregabalin (LYRICA) 150 MG capsule      • promethazine (PHENERGAN) 25 MG tablet Take 25 mg by mouth Every 6 (Six) Hours As Needed for Nausea or Vomiting.     • senna (SENOKOT) 8.6 MG tablet tablet Take  by mouth Every Night.     • sertraline (ZOLOFT) 100 MG tablet Take 100 mg by mouth Daily.     • simvastatin (ZOCOR) 20 MG tablet Take 20 mg by mouth Every Night.       No current facility-administered medications on file prior to visit.      Allergies   Allergen Reactions   • Sulfa Antibiotics Other (See Comments)     Very hyperactive          Review of Systems   Constitutional: Negative.    HENT: Negative.    Eyes: Negative.    Respiratory: Negative.    Cardiovascular: Negative.    Gastrointestinal: Negative.    Endocrine: Negative.    Genitourinary: Negative.    Musculoskeletal: Positive for arthralgias and joint swelling.   Skin: Negative.    Allergic/Immunologic: Negative.    Neurological: Negative.    Hematological: Negative.    Psychiatric/Behavioral: Negative.         I reviewed the patient's chief complaint, history of present illness, review of systems, past medical history, surgical history, family history, social history, medications and allergy list.        Objective      Physical Exam  /58   Pulse 83   Ht 167.6 cm (65.98\")   Wt 120 kg (264 lb 8.8 oz)   BMI 42.72 kg/m²     Body mass index is 42.72 kg/m².    General  Mental Status - alert  General Appearance - " cooperative, well groomed, not in acute distress  Orientation - Oriented X3  Build & Nutrition - well developed and well nourished  Posture - normal posture  Gait - normal gait       Ortho Exam  V:  Dorsalis Pedis:  Right: 2+; Left:2+    Posterior Tibial: Right:2+; Left:2+    Capillary Refill:  Brisk  MSK:      Tibia:  Right:  non tender; Left:  non tender      Ankle:  Right: non tender, ROM  normal and motor function  normal; Left:  tender diffuse tenderness with no swelling, warmth or erythema, ROM  normal and motor function  normal      Foot:  Right:  non tender; Left:  tender diffuse tenderness      NEURO: Heel Walking:  Right:  unable to test; Left:  unable to test       Toe Walking:  Right:  unable to test; Left:  unable to test     [place]-Bal[place]inder 5.07 monofilament test: not evaluated    Lower extremity sensation: intact     Calf Atrophy:none    Motor Function: all 5/5    Antalgic gait  .    Imaging/Studies  Standing foot and ankle x-ray of the left foot from 6/25/2021. Normal alignment, normal joint spaces no fracture.    Assessment    Assessment:  1. Right ankle pain, unspecified chronicity    2. Right ankle injury, initial encounter        Plan:  1. Continue over-the-counter medication as needed for discomfort  2. Right ankle injury with chronic pain. I reviewed today's x-rays, prior x-rays from 4/22/2021, outside notes and clinical findings with the patient.  she is having persisting pain and dysfunction that is making it difficult for her to do daily activities. She has failed anti-inflammatories activity modification. Recommendation today is that she begin physical therapy as recommended mendramiro. We'll get an MRI of the left ankle for further evaluation. She'll return following the MRI to discuss further treatment recommendations.    Patient history, diagnosis and treatment plan discussed with Dr. Parsons.      Lin Ohara PA-C  06/27/21  17:15 EDT    Dragon disclaimer:  Much of this encounter  note is an electronic transcription/translation of spoken language to printed text. The electronic translation of spoken language may permit erroneous, or at times, nonsensical words or phrases to be inadvertently transcribed; Although I have reviewed the note for such errors, some may still exist.

## 2021-09-16 ENCOUNTER — TELEPHONE (OUTPATIENT)
Dept: ORTHOPEDIC SURGERY | Facility: CLINIC | Age: 36
End: 2021-09-16

## 2021-11-02 ENCOUNTER — HOSPITAL ENCOUNTER (OUTPATIENT)
Dept: MRI IMAGING | Facility: HOSPITAL | Age: 36
Discharge: HOME | End: 2021-11-02
Admitting: PHYSICIAN ASSISTANT

## 2021-11-02 DIAGNOSIS — M25.571 RIGHT ANKLE PAIN, UNSPECIFIED CHRONICITY: ICD-10-CM

## 2021-11-02 PROCEDURE — 73721 MRI JNT OF LWR EXTRE W/O DYE: CPT

## 2021-11-10 ENCOUNTER — OFFICE VISIT (OUTPATIENT)
Dept: ORTHOPEDIC SURGERY | Facility: CLINIC | Age: 36
End: 2021-11-10

## 2021-11-10 VITALS
BODY MASS INDEX: 40.04 KG/M2 | HEIGHT: 65 IN | SYSTOLIC BLOOD PRESSURE: 148 MMHG | WEIGHT: 240.3 LBS | DIASTOLIC BLOOD PRESSURE: 85 MMHG

## 2021-11-10 DIAGNOSIS — S99.912D LEFT ANKLE INJURY, SUBSEQUENT ENCOUNTER: ICD-10-CM

## 2021-11-10 DIAGNOSIS — M25.572 CHRONIC PAIN OF LEFT ANKLE: Primary | ICD-10-CM

## 2021-11-10 DIAGNOSIS — G89.29 CHRONIC PAIN OF LEFT ANKLE: Primary | ICD-10-CM

## 2021-11-10 PROCEDURE — 99213 OFFICE O/P EST LOW 20 MIN: CPT | Performed by: PHYSICIAN ASSISTANT

## 2021-11-10 RX ORDER — HYDROCODONE BITARTRATE AND ACETAMINOPHEN 10; 325 MG/1; MG/1
TABLET ORAL
COMMUNITY
Start: 2021-10-05

## 2021-11-10 RX ORDER — TRAZODONE HYDROCHLORIDE 50 MG/1
TABLET ORAL
COMMUNITY
Start: 2021-10-23

## 2021-11-10 RX ORDER — NALOXONE HYDROCHLORIDE 4 MG/.1ML
SPRAY NASAL
COMMUNITY
Start: 2021-10-11

## 2021-11-10 NOTE — PROGRESS NOTES
"        [facility] [place] [facility] Surgery Clinic Note        Subjective     CC: Follow-up (MRI Left Ankle performed 11.02.2021)      SAEED Barber is a 36 y.o. female.  Patient returns today for follow-up of left ankle MRI.  She reports persisting pain in the left ankle.  She complains of mostly lateral pain with pain radiating up the leg into the thigh.  No new trauma or injury.  She is using a cane which she reports is for the ankle.  She does have a history of MS.    Overall, patient's symptoms are the same    ROS:    Constiutional:Pt denies fever, chills, nausea, or vomiting.  MSK:as above        Objective      Past Medical History  Past Medical History:   Diagnosis Date   • Anxiety    • Asthma    • Coughing     worse at night   • Current smoker    • Depression     \"adjustment disorder\"   • Fatigue    • GERD (gastroesophageal reflux disease)     takes famotidine BID.   EGD 4 years ago.  Unknown findings. No h/o h pylori.    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  Worsened by falls. Uses cane. No surgeries.    • IBS (irritable bowel syndrome)     mixed diarrhea/ constipation   • Joint pain     hips, legs, shoulder, lower back.  Celebrex, muscle relaxers, gabapentin. No steroid injections.    • Lower extremity edema    • Migraines    • Morbid obesity with BMI of 40.0-44.9, adult (HCC)    • MS (multiple sclerosis) (HCC)     Limits mobility with imbalance.  Diagnosed 2016 with loss of vision.  3-4 flares a year requiring ED or hospitalization, treated with steroids.     • Optic neuritis    • Pseudotumor cerebri     followed by Neurology, on Diamox   • S/P jack     acalculous    • Seasonal allergies    • Short-term memory loss     secondary to MS   • SOB (shortness of breath) on exertion          Physical Exam  /85   Ht 165.1 cm (65\")   Wt 109 kg (240 lb 4.8 oz)   BMI 39.99 kg/m²     Body mass index is 39.99 kg/m².    Patient is well nourished and well developed.        Ortho Exam  Left ankle exam: Tender " palpation around the lateral malleolus with no medial tenderness.  Anterior ankle joint tenderness.  4/5 motor strength.  Neurovascular intact distally.  Ambulating with a cane.    Imaging/Labs/EMG Reviewed:  Imaging Results (Last 24 Hours)     ** No results found for the last 24 hours. **            Assessment    Assessment:  1. Chronic pain of left ankle    2. Left ankle injury, subsequent encounter        Plan:  1. Recommend over the counter anti-inflammatories for pain and/or swelling  2. Left ankle pain with distant injury.  I reviewed MRI from 11/2/2021 clinical findings past and current treatment the patient.  MRI shows injury to the ATFL consistent with prior ankle sprains.  There is no evidence of any thing worrisome or injury that is indicative of surgery.  Recommendation today is the patient begin physical therapy.  We will see her back as needed.    Patient history, diagnosis and treatment plan discussed with Dr. Parsons.        Lin Ohara PA-C  11/10/21  16:03 EST